# Patient Record
Sex: FEMALE | Race: WHITE | Employment: OTHER | URBAN - METROPOLITAN AREA
[De-identification: names, ages, dates, MRNs, and addresses within clinical notes are randomized per-mention and may not be internally consistent; named-entity substitution may affect disease eponyms.]

---

## 2017-01-06 ENCOUNTER — CLINICAL SUPPORT (OUTPATIENT)
Dept: CARDIOLOGY CLINIC | Age: 82
End: 2017-01-06

## 2017-01-06 DIAGNOSIS — Z95.0 CARDIAC PACEMAKER IN SITU: Primary | ICD-10-CM

## 2017-04-10 ENCOUNTER — CLINICAL SUPPORT (OUTPATIENT)
Dept: CARDIOLOGY CLINIC | Age: 82
End: 2017-04-10

## 2017-04-10 DIAGNOSIS — Z95.0 CARDIAC PACEMAKER IN SITU: Primary | ICD-10-CM

## 2017-07-13 ENCOUNTER — CLINICAL SUPPORT (OUTPATIENT)
Dept: CARDIOLOGY CLINIC | Age: 82
End: 2017-07-13

## 2017-07-13 DIAGNOSIS — Z95.0 CARDIAC PACEMAKER IN SITU: Primary | ICD-10-CM

## 2017-10-19 ENCOUNTER — CLINICAL SUPPORT (OUTPATIENT)
Dept: CARDIOLOGY CLINIC | Age: 82
End: 2017-10-19

## 2017-10-19 DIAGNOSIS — Z95.0 CARDIAC PACEMAKER IN SITU: Primary | ICD-10-CM

## 2018-01-24 ENCOUNTER — CLINICAL SUPPORT (OUTPATIENT)
Dept: CARDIOLOGY CLINIC | Age: 83
End: 2018-01-24

## 2018-01-24 DIAGNOSIS — Z95.0 CARDIAC PACEMAKER IN SITU: Primary | ICD-10-CM

## 2018-04-25 ENCOUNTER — HOSPITAL ENCOUNTER (OUTPATIENT)
Dept: LAB | Age: 83
Discharge: HOME | End: 2018-04-25
Payer: MEDICARE

## 2018-04-25 ENCOUNTER — OFFICE VISIT (OUTPATIENT)
Dept: CARDIOLOGY CLINIC | Age: 83
End: 2018-04-25

## 2018-04-25 VITALS
RESPIRATION RATE: 16 BRPM | SYSTOLIC BLOOD PRESSURE: 116 MMHG | HEART RATE: 64 BPM | WEIGHT: 146 LBS | OXYGEN SATURATION: 96 % | DIASTOLIC BLOOD PRESSURE: 52 MMHG | HEIGHT: 63 IN | BODY MASS INDEX: 25.87 KG/M2

## 2018-04-25 DIAGNOSIS — Z95.0 CARDIAC PACEMAKER IN SITU: Primary | ICD-10-CM

## 2018-04-25 DIAGNOSIS — I48.91 ATRIAL FIBRILLATION, UNSPECIFIED TYPE (HCC): Primary | ICD-10-CM

## 2018-04-25 DIAGNOSIS — Z95.0 PACEMAKER: ICD-10-CM

## 2018-04-25 PROCEDURE — 80048 BASIC METABOLIC PNL TOTAL CA: CPT

## 2018-04-25 PROCEDURE — 85027 COMPLETE CBC AUTOMATED: CPT

## 2018-04-25 PROCEDURE — 85610 PROTHROMBIN TIME: CPT

## 2018-04-25 PROCEDURE — 36415 COLL VENOUS BLD VENIPUNCTURE: CPT

## 2018-04-25 RX ORDER — ATORVASTATIN CALCIUM 40 MG/1
40 TABLET, FILM COATED ORAL DAILY
COMMUNITY

## 2018-04-25 RX ORDER — LISINOPRIL 2.5 MG/1
2.5 TABLET ORAL DAILY
COMMUNITY

## 2018-04-25 RX ORDER — LABETALOL 200 MG/1
200 TABLET, FILM COATED ORAL 2 TIMES DAILY
COMMUNITY

## 2018-04-25 RX ORDER — CHOLECALCIFEROL (VITAMIN D3) 125 MCG
2000 CAPSULE ORAL DAILY
COMMUNITY

## 2018-04-25 RX ORDER — HYDROCHLOROTHIAZIDE 50 MG/1
50 TABLET ORAL DAILY
COMMUNITY
End: 2019-06-27

## 2018-04-25 RX ORDER — LANOLIN ALCOHOL/MO/W.PET/CERES
1000 CREAM (GRAM) TOPICAL DAILY
COMMUNITY

## 2018-04-25 NOTE — PATIENT INSTRUCTIONS
Treatment Plan: You are scheduled for a pacemaker generator change at [facility] on Thursday, May 3rd. Please arrive at the patient registration desk located on the 2nd floor of the main building at 9:00am.    Do not eat or drink anything after midnight the night before your procedure. You will need a . You may take your normal medications with a sip of water the morning of your procedure. Except for the following: Pradaxa  Blood thinner instructions: Hold Pradaxa 1 day prior to procedure. Lab instructions: Please have labs drawn 3-5 days prior to scheduled procedure. Please call Ayala Cherry or Toyin Logan if you have any questions at [phone]. Please call the office if you develop any type of illness prior to your procedure. Pacemaker Placement: Before Your Procedure  What is pacemaker placement? A pacemaker is a small, battery-powered device. It sends electrical signals to the heart. This keeps the heartbeat steady when you have bradycardia (a slow heart rate). Thin wires, called leads, carry the signals between the pacemaker and the heart. This device is also called a pacer. You will get medicine before the procedure. This helps you relax and helps prevent pain. The doctor will make a cut in the skin just below your collarbone. The cut may be on either side of your chest. The doctor will put the pacemaker leads through the cut. The leads go into a large blood vessel in the upper chest. Then the doctor will guide the leads through the blood vessel into the heart. The doctor will place the pacemaker under the skin of your chest. He or she will attach the leads to the pacemaker. Then the cut will be closed with stitches. The procedure usually takes about an hour. You may need to spend the night in the hospital.  Pacemaker batteries usually last 5 to 15 years.  Your doctor will talk to you about how often you will need to have your pacemaker and battery checked. You can likely return to many of your normal activities after your procedure. But to stay safe, you may need to make some changes in your normal routine. You may feel worried about having a pacemaker. This is common. You might feel better if you learn techniques to help you relax. And it can help if you learn about how the pacemaker helps your heart. Talk to your doctor about your questions and concerns. Follow-up care is a key part of your treatment and safety. Be sure to make and go to all appointments, and call your doctor if you are having problems. It's also a good idea to know your test results and keep a list of the medicines you take. What happens before the procedure? ?Preparing for the procedure  ? · Understand exactly what procedure is planned, along with the risks, benefits, and other options. · Tell your doctors ALL the medicines, vitamins, supplements, and herbal remedies you take. Some of these can increase the risk of bleeding or interact with anesthesia. ? · If you take aspirin or some other blood thinner, be sure to talk to your doctor. He or she will tell you if you should stop taking these medicines before the procedure. Make sure that you understand exactly what your doctor wants you to do.   ? · Your doctor will tell you which medicines to take or stop before your procedure. You may need to stop taking certain medicines a week or more before the procedure. So talk to your doctor as soon as you can.   ? · If you have an advance directive, let your doctor know. It may include a living will and a durable power of  for health care. Bring a copy to the hospital. If you don't have one, you may want to prepare one. It lets your doctor and loved ones know your health care wishes. Doctors advise that everyone prepare these papers before any type of surgery or procedure. Procedures can be stressful. This information will help you understand what you can expect.  And it will help you safely prepare for your procedure. What happens on the day of the procedure? · Follow the instructions exactly about when to stop eating and drinking. If you don't, your procedure may be canceled. If your doctor told you to take your medicines on the day of the procedure, take them with only a sip of water. ? · Take a bath or shower before you come in for your procedure. Do not apply lotions, perfumes, deodorants, or nail polish. ? · Take off all jewelry and piercings. And take out contact lenses, if you wear them. ? At the hospital or surgery center   · Bring a picture ID. ? · You will be kept comfortable and safe by your anesthesia provider. You may get medicine that relaxes you or puts you in a light sleep. The area being worked on will be numb. ? · The procedure will take about an hour. Going home   · Be sure you have someone to drive you home. Anesthesia and pain medicine make it unsafe for you to drive. ? · You will be given more specific instructions about recovering from your procedure. They will cover things like diet, wound care, follow-up care, driving, and getting back to your normal routine. When should you call your doctor? · You have questions or concerns. ? · You don't understand how to prepare for your procedure. ? · You become ill before the procedure (such as fever, flu, or a cold). ? · You need to reschedule or have changed your mind about having the procedure. Where can you learn more? Go to http://veronica-kobi.info/. Enter X791 in the search box to learn more about \"Pacemaker Placement: Before Your Procedure. \"  Current as of: September 21, 2016  Content Version: 11.4  © 9964-8423 [employer]. Care instructions adapted under license by [employer] (which disclaims liability or warranty for this information).  If you have questions about a medical condition or this instruction, always ask your healthcare professional. [address] any warranty or liability for your use of this information.

## 2018-04-25 NOTE — PROGRESS NOTES
Visit Vitals    /52 (BP 1 Location: Left arm, BP Patient Position: Sitting)    Pulse 64    Resp 16    Ht 5' 3\" (1.6 m)    Wt 146 lb (66.2 kg)    SpO2 96%    BMI 25.86 kg/m2     Medication changes made  per verbal order of Dr. Lindajo Olszewski

## 2018-04-25 NOTE — PROGRESS NOTES
HISTORY OF PRESENTING ILLNESS      Chidi Charles is a 81 y.o. female with history of pacemaker, diabetes mellitus, dyslipidemia, atrial fibrillation, hypertension, complete heart block who is presenting to establish in our clinic. Her pacemaker was found to be at [facility]. She recently underwent stress test and echocardiogram with Dr. Elgin Hodge. Her left ventricular function was found to be preserved. ACTIVE PROBLEM LIST     Patient Active Problem List    Diagnosis Date Noted    Complete heart block ([address] 04/15/2011    AF (atrial fibrillation) ([address] 04/15/2011    HTN (hypertension), benign 04/15/2011    Orthostatic hypotension 04/15/2011           PAST MEDICAL HISTORY     Past Medical History:   Diagnosis Date    Atrial fibrillation ([address]     Cataract     Diabetes ([address]     Hypercholesterolemia     Hypertension     Pacemaker     Metronic           PAST SURGICAL HISTORY     Past Surgical History:   Procedure Laterality Date    COLONOSCOPY  4/14/2016         HX AFIB ABLATION      HX COLONOSCOPY      HX PACEMAKER  2007    Medtronic 2007          ALLERGIES     No Known Allergies       FAMILY HISTORY     No family history on file. negative for cardiac disease       SOCIAL HISTORY     Social History     Social History    Marital status: SINGLE     Spouse name: N/A    Number of children: N/A    Years of education: N/A     Social History Main Topics    Smoking status: Never Smoker    Smokeless tobacco: Not on file    Alcohol use No    Drug use: No    Sexual activity: Not on file     Other Topics Concern    Not on file     Social History Narrative         MEDICATIONS     Current Outpatient Prescriptions   Medication Sig    hydrochlorothiazide (HYDRODIURIL) 25 mg tablet Take 25 mg by mouth daily.  amLODIPine (NORVASC) 2.5 mg tablet Take 2.5 mg by mouth daily.  omeprazole (PRILOSEC) 20 mg capsule Take 20 mg by mouth daily.     lisinopril (PRINIVIL, ZESTRIL) 2.5 mg tablet Take 2.5 mg by mouth daily.  HYDROcodone-acetaminophen (NORCO) 5-325 mg per tablet Take 1 Tab by mouth every six (6) hours as needed for Pain. Max Daily Amount: 4 Tabs.  diazepam (VALIUM) 2 mg tablet Take 1 Tab by mouth every eight (8) hours as needed for Anxiety. Max Daily Amount: 6 mg.  metFORMIN (GLUCOPHAGE) 500 mg tablet Take 500 mg by mouth two (2) times daily (with meals).  DOCOSAHEXANOIC ACID/EPA (FISH OIL PO) Take 3,000 mg by mouth daily.  DABIGATRAN ETEXILATE MESYLATE (PRADAXA PO) Take 150 mg by mouth two (2) times a day.  ERGOCALCIFEROL, VITAMIN D2, (VITAMIN D PO) Take 2,000 Units by mouth daily.  VITAMIN B COMPLEX NO.12-NIACIN PO Take 1,000 mcg by mouth.  labetalol (NORMODYNE) 300 mg tablet Take 200 mg by mouth two (2) times a day.  atorvastatin (LIPITOR) 20 mg tablet Take  by mouth daily.  acarbose (PRECOSE) 50 mg tablet Take 50 mg by mouth three (3) times daily (with meals).  fenofibrate nanocrystallized (TRICOR) 48 mg tablet Take  by mouth daily. No current facility-administered medications for this visit. I have reviewed the nurses notes, vitals, problem list, allergy list, medical history, family, social history and medications. REVIEW OF SYMPTOMS      General: Pt denies excessive weight gain or loss. Pt is able to conduct ADL's  HEENT: Denies blurred vision, headaches, hearing loss, epistaxis and difficulty swallowing. Respiratory: Denies cough, congestion, shortness of breath, SANTOS, wheezing or stridor.   Cardiovascular: Denies precordial pain, palpitations, edema or PND  Gastrointestinal: Denies poor appetite, indigestion, abdominal pain or blood in stool  Genitourinary: Denies hematuria, dysuria, increased urinary frequency  Musculoskeletal: Denies joint pain or swelling from muscles or joints  Neurologic: Denies tremor, paresthesias, headache, or sensory motor disturbance  Psychiatric: Denies confusion, insomnia, depression  Integumentray: Denies rash, itching or ulcers. Hematologic: Denies easy bruising, bleeding       PHYSICAL EXAMINATION      There were no vitals filed for this visit. General: Well developed, in no acute distress. HEENT: No jaundice, oral mucosa moist, no oral ulcers  Neck: Supple, no stiffness, no lymphadenopathy, supple  Heart:  Normal S1/S2 negative S3 or S4. Regular, no murmur, gallop or rub, no jugular venous distention  Respiratory: Clear bilaterally x 4, no wheezing or rales  Abdomen:   Soft, non-tender, bowel sounds are active.   Extremities:  No edema, normal cap refill, no cyanosis. Musculoskeletal: No clubbing, no deformities  Neuro: A&Ox3, speech clear, gait stable, cooperative, no focal neurologic deficits  Skin: Skin color is normal. No rashes or lesions. Non diaphoretic, moist.  Vascular: 2+ pulses symmetric in all extremities       DIAGNOSTIC DATA      EKG:        LABORATORY DATA      Lab Results   Component Value Date/Time    WBC 8.6 07/27/2009 09:00 AM    HGB 13.2 07/27/2009 09:00 AM    HCT 37.9 07/27/2009 09:00 AM    PLATELET 977 79/70/8119 09:00 AM    MCV 84.6 07/27/2009 09:00 AM      Lab Results   Component Value Date/Time    Sodium 138 07/27/2009 09:00 AM    Potassium 4.3 07/27/2009 09:00 AM    Chloride 103 07/27/2009 09:00 AM    CO2 27 07/27/2009 09:00 AM    Anion gap 8 07/27/2009 09:00 AM    Glucose 163 (H) 07/27/2009 09:00 AM    BUN 23 (H) 07/27/2009 09:00 AM    Creatinine 0.9 07/27/2009 09:00 AM    BUN/Creatinine ratio 26 (H) 07/27/2009 09:00 AM    GFR est AA >60 07/27/2009 09:00 AM    GFR est non-AA >60 07/27/2009 09:00 AM    Calcium 8.9 07/27/2009 09:00 AM    Bilirubin, total 0.4 07/27/2009 09:00 AM    AST (SGOT) 35 07/27/2009 09:00 AM    Alk. phosphatase 88 07/27/2009 09:00 AM    Protein, total 6.9 07/27/2009 09:00 AM    Albumin 3.9 07/27/2009 09:00 AM    Globulin 3.0 07/27/2009 09:00 AM    A-G Ratio 1.3 07/27/2009 09:00 AM    ALT (SGPT) 40 07/27/2009 09:00 AM           ASSESSMENT      1. Pacemaker   A. Left-sided   B. Medtronic   C. Pacemaker dependent  2. Atrial fibrillation  3. Hypertension  4. Dyslipidemia  5. Complete heart block         PLAN     Plan for pacemaker generator change with Medtr[employer]. Will hold Pradaxa one day prior. FOLLOW-UP     Post procedure      Thank you, Shanice Germain MD and Dr. Sydni Forbes for allowing me to participate in the care of this extraordinarily pleasant female. Please do not hesitate to contact me for further questions/concerns.          Santi Quijano MD  Cardiac Electrophysiology / Cardiology    [facility].  [address] [place] [address], [facility], [address]  [phone] / [phone] Fax   [phone] / [phone] Fax

## 2018-04-26 PROBLEM — Z45.018 ELECTIVE REPLACEMENT INDICATED FOR PACEMAKER: Status: ACTIVE | Noted: 2018-04-26

## 2018-04-26 LAB
BUN SERPL-MCNC: 15 MG/DL (ref 8–27)
BUN/CREAT SERPL: 19 (ref 12–28)
CALCIUM SERPL-MCNC: 9 MG/DL (ref 8.7–10.3)
CHLORIDE SERPL-SCNC: 102 MMOL/L (ref 96–106)
CO2 SERPL-SCNC: 23 MMOL/L (ref 18–29)
CREAT SERPL-MCNC: 0.77 MG/DL (ref 0.57–1)
ERYTHROCYTE [DISTWIDTH] IN BLOOD BY AUTOMATED COUNT: 15 % (ref 12.3–15.4)
GFR SERPLBLD CREATININE-BSD FMLA CKD-EPI: 72 ML/MIN/1.73
GFR SERPLBLD CREATININE-BSD FMLA CKD-EPI: 83 ML/MIN/1.73
GLUCOSE SERPL-MCNC: 123 MG/DL (ref 65–99)
HCT VFR BLD AUTO: 39.1 % (ref 34–46.6)
HGB BLD-MCNC: 13 G/DL (ref 11.1–15.9)
INR PPP: 5.3 (ref 0.8–1.2)
MCH RBC QN AUTO: 30 PG (ref 26.6–33)
MCHC RBC AUTO-ENTMCNC: 33.2 G/DL (ref 31.5–35.7)
MCV RBC AUTO: 90 FL (ref 79–97)
PLATELET # BLD AUTO: 172 X10E3/UL (ref 150–379)
POTASSIUM SERPL-SCNC: 4.3 MMOL/L (ref 3.5–5.2)
PROTHROMBIN TIME: 50 SEC (ref 9.1–12)
RBC # BLD AUTO: 4.33 X10E6/UL (ref 3.77–5.28)
SODIUM SERPL-SCNC: 143 MMOL/L (ref 134–144)
WBC # BLD AUTO: 7.4 X10E3/UL (ref 3.4–10.8)

## 2018-04-26 RX ORDER — SODIUM CHLORIDE 0.9 % (FLUSH) 0.9 %
5-10 SYRINGE (ML) INJECTION EVERY 8 HOURS
Status: CANCELLED | OUTPATIENT
Start: 2018-04-26

## 2018-04-26 RX ORDER — SODIUM CHLORIDE 0.9 % (FLUSH) 0.9 %
5-10 SYRINGE (ML) INJECTION AS NEEDED
Status: CANCELLED | OUTPATIENT
Start: 2018-04-26

## 2018-05-02 ENCOUNTER — TELEPHONE (OUTPATIENT)
Dept: CARDIOLOGY CLINIC | Age: 83
End: 2018-05-02

## 2018-05-03 ENCOUNTER — HOSPITAL ENCOUNTER (OUTPATIENT)
Dept: NON INVASIVE DIAGNOSTICS | Age: 83
Discharge: HOME | End: 2018-05-03
Attending: INTERNAL MEDICINE | Admitting: INTERNAL MEDICINE
Payer: MEDICARE

## 2018-05-03 ENCOUNTER — TELEPHONE (OUTPATIENT)
Dept: CARDIOLOGY CLINIC | Age: 83
End: 2018-05-03

## 2018-05-03 VITALS
BODY MASS INDEX: 25.16 KG/M2 | RESPIRATION RATE: 13 BRPM | HEIGHT: 63 IN | SYSTOLIC BLOOD PRESSURE: 125 MMHG | OXYGEN SATURATION: 98 % | HEART RATE: 70 BPM | WEIGHT: 141.98 LBS | DIASTOLIC BLOOD PRESSURE: 51 MMHG | TEMPERATURE: 97.6 F

## 2018-05-03 LAB
GLUCOSE BLD STRIP.AUTO-MCNC: 119 MG/DL (ref 65–100)
GLUCOSE BLD STRIP.AUTO-MCNC: 123 MG/DL (ref 65–100)
INR BLD: 1.3 (ref 0.9–1.2)
SERVICE CMNT-IMP: ABNORMAL
SERVICE CMNT-IMP: ABNORMAL

## 2018-05-03 PROCEDURE — 77030018729 HC ELECTRD DEFIB PAD CARD -B

## 2018-05-03 PROCEDURE — 77030028698 HC BLD TISS PLSM MEDT -D

## 2018-05-03 PROCEDURE — C1785 PMKR, DUAL, RATE-RESP: HCPCS | Performed by: INTERNAL MEDICINE

## 2018-05-03 PROCEDURE — 77030002933 HC SUT MCRYL J&J -A

## 2018-05-03 PROCEDURE — 85610 PROTHROMBIN TIME: CPT

## 2018-05-03 PROCEDURE — 74011000250 HC RX REV CODE- 250: Performed by: INTERNAL MEDICINE

## 2018-05-03 PROCEDURE — 99152 MOD SED SAME PHYS/QHP 5/>YRS: CPT | Performed by: INTERNAL MEDICINE

## 2018-05-03 PROCEDURE — 33228 REMV&REPLC PM GEN DUAL LEAD: CPT

## 2018-05-03 PROCEDURE — 77030012935 HC DRSG AQUACEL BMS -B

## 2018-05-03 PROCEDURE — 99153 MOD SED SAME PHYS/QHP EA: CPT | Performed by: INTERNAL MEDICINE

## 2018-05-03 PROCEDURE — 82962 GLUCOSE BLOOD TEST: CPT

## 2018-05-03 PROCEDURE — 77030031139 HC SUT VCRL2 J&J -A

## 2018-05-03 PROCEDURE — 74011250636 HC RX REV CODE- 250/636: Performed by: INTERNAL MEDICINE

## 2018-05-03 PROCEDURE — 77030011640 HC PAD GRND REM COVD -A

## 2018-05-03 PROCEDURE — 77030018673

## 2018-05-03 RX ORDER — SODIUM CHLORIDE 0.9 % (FLUSH) 0.9 %
5-10 SYRINGE (ML) INJECTION EVERY 8 HOURS
Status: CANCELLED | OUTPATIENT
Start: 2018-05-03

## 2018-05-03 RX ORDER — FENTANYL CITRATE 50 UG/ML
25-200 INJECTION, SOLUTION INTRAMUSCULAR; INTRAVENOUS
Status: DISCONTINUED | OUTPATIENT
Start: 2018-05-03 | End: 2018-05-03 | Stop reason: HOSPADM

## 2018-05-03 RX ORDER — ACETAMINOPHEN 325 MG/1
650 TABLET ORAL
Status: CANCELLED | OUTPATIENT
Start: 2018-05-03

## 2018-05-03 RX ORDER — BUPIVACAINE HYDROCHLORIDE 5 MG/ML
20 INJECTION, SOLUTION EPIDURAL; INTRACAUDAL ONCE
Status: COMPLETED | OUTPATIENT
Start: 2018-05-03 | End: 2018-05-03

## 2018-05-03 RX ORDER — DIPHENHYDRAMINE HYDROCHLORIDE 50 MG/ML
25-50 INJECTION, SOLUTION INTRAMUSCULAR; INTRAVENOUS ONCE
Status: COMPLETED | OUTPATIENT
Start: 2018-05-03 | End: 2018-05-03

## 2018-05-03 RX ORDER — CEFAZOLIN SODIUM/WATER 2 G/20 ML
2 SYRINGE (ML) INTRAVENOUS ONCE
Status: COMPLETED | OUTPATIENT
Start: 2018-05-03 | End: 2018-05-03

## 2018-05-03 RX ORDER — SODIUM CHLORIDE 0.9 % (FLUSH) 0.9 %
5-10 SYRINGE (ML) INJECTION AS NEEDED
Status: CANCELLED | OUTPATIENT
Start: 2018-05-03

## 2018-05-03 RX ORDER — HYDROCODONE BITARTRATE AND ACETAMINOPHEN 5; 325 MG/1; MG/1
1 TABLET ORAL
Status: CANCELLED | OUTPATIENT
Start: 2018-05-03

## 2018-05-03 RX ORDER — ONDANSETRON 2 MG/ML
4 INJECTION INTRAMUSCULAR; INTRAVENOUS
Status: CANCELLED | OUTPATIENT
Start: 2018-05-03

## 2018-05-03 RX ORDER — CEPHALEXIN 500 MG/1
500 CAPSULE ORAL 3 TIMES DAILY
Qty: 15 CAP | Refills: 0 | Status: SHIPPED | OUTPATIENT
Start: 2018-05-03 | End: 2018-05-08

## 2018-05-03 RX ORDER — LIDOCAINE HYDROCHLORIDE AND EPINEPHRINE 10; 10 MG/ML; UG/ML
20 INJECTION, SOLUTION INFILTRATION; PERINEURAL ONCE
Status: COMPLETED | OUTPATIENT
Start: 2018-05-03 | End: 2018-05-03

## 2018-05-03 RX ORDER — SODIUM CHLORIDE 0.9 % (FLUSH) 0.9 %
5-10 SYRINGE (ML) INJECTION EVERY 8 HOURS
Status: DISCONTINUED | OUTPATIENT
Start: 2018-05-03 | End: 2018-05-03 | Stop reason: HOSPADM

## 2018-05-03 RX ORDER — HEPARIN SODIUM 200 [USP'U]/100ML
500 INJECTION, SOLUTION INTRAVENOUS ONCE
Status: COMPLETED | OUTPATIENT
Start: 2018-05-03 | End: 2018-05-03

## 2018-05-03 RX ORDER — GENTAMICIN SULFATE 80 MG/100ML
80 INJECTION, SOLUTION INTRAVENOUS ONCE
Status: COMPLETED | OUTPATIENT
Start: 2018-05-03 | End: 2018-05-03

## 2018-05-03 RX ORDER — SODIUM CHLORIDE 0.9 % (FLUSH) 0.9 %
5-10 SYRINGE (ML) INJECTION AS NEEDED
Status: DISCONTINUED | OUTPATIENT
Start: 2018-05-03 | End: 2018-05-03 | Stop reason: HOSPADM

## 2018-05-03 RX ORDER — BACITRACIN 50000 [IU]/1
INJECTION, POWDER, FOR SOLUTION INTRAMUSCULAR
Status: DISCONTINUED
Start: 2018-05-03 | End: 2018-05-03 | Stop reason: HOSPADM

## 2018-05-03 RX ORDER — MIDAZOLAM HYDROCHLORIDE 1 MG/ML
.5-1 INJECTION, SOLUTION INTRAMUSCULAR; INTRAVENOUS
Status: DISCONTINUED | OUTPATIENT
Start: 2018-05-03 | End: 2018-05-03 | Stop reason: HOSPADM

## 2018-05-03 RX ADMIN — LIDOCAINE HYDROCHLORIDE,EPINEPHRINE BITARTRATE 200 MG: 10; .01 INJECTION, SOLUTION INFILTRATION; PERINEURAL at 12:13

## 2018-05-03 RX ADMIN — HEPARIN SODIUM IN SODIUM CHLORIDE 1000 UNITS: 200 INJECTION INTRAVENOUS at 12:08

## 2018-05-03 RX ADMIN — BUPIVACAINE HYDROCHLORIDE 100 MG: 5 INJECTION, SOLUTION EPIDURAL; INTRACAUDAL at 12:13

## 2018-05-03 RX ADMIN — SODIUM CHLORIDE: 900 INJECTION, SOLUTION INTRAVENOUS at 12:17

## 2018-05-03 RX ADMIN — Medication 2 G: at 12:05

## 2018-05-03 RX ADMIN — GENTAMICIN SULFATE 80 MG: 80 INJECTION, SOLUTION INTRAVENOUS at 12:10

## 2018-05-03 RX ADMIN — MIDAZOLAM HYDROCHLORIDE 1 MG: 1 INJECTION, SOLUTION INTRAMUSCULAR; INTRAVENOUS at 12:14

## 2018-05-03 RX ADMIN — MIDAZOLAM HYDROCHLORIDE 2 MG: 1 INJECTION, SOLUTION INTRAMUSCULAR; INTRAVENOUS at 12:09

## 2018-05-03 RX ADMIN — DIPHENHYDRAMINE HYDROCHLORIDE 25 MG: 50 INJECTION, SOLUTION INTRAMUSCULAR; INTRAVENOUS at 12:05

## 2018-05-03 RX ADMIN — FENTANYL CITRATE 25 MCG: 50 INJECTION, SOLUTION INTRAMUSCULAR; INTRAVENOUS at 12:09

## 2018-05-03 NOTE — H&P
HISTORY OF PRESENTING ILLNESS       Reji Lagunas is a 81 y.o. female with history of pacemaker, diabetes mellitus, dyslipidemia, atrial fibrillation, hypertension, complete heart block who is presenting to establish in our clinic. Her pacemaker was found to be at [facility]. She recently underwent stress test and echocardiogram with Dr. Judit Levy. Her left ventricular function was found to be preserved.         ACTIVE PROBLEM LIST           Patient Active Problem List     Diagnosis Date Noted    Complete heart block ([address] 04/15/2011    AF (atrial fibrillation) ([address] 04/15/2011    HTN (hypertension), benign 04/15/2011    Orthostatic hypotension 04/15/2011             PAST MEDICAL HISTORY           Past Medical History:   Diagnosis Date    Atrial fibrillation ([address]      Cataract      Diabetes ([address]      Hypercholesterolemia      Hypertension      Pacemaker       Metronic             PAST SURGICAL HISTORY            Past Surgical History:   Procedure Laterality Date    COLONOSCOPY   4/14/2016           HX AFIB ABLATION        HX COLONOSCOPY        HX PACEMAKER   2007     Medtronic 2007             ALLERGIES      No Known Allergies         FAMILY HISTORY      No family history on file. negative for cardiac disease         SOCIAL HISTORY       Social History                Social History    Marital status: SINGLE       Spouse name: N/A    Number of children: N/A    Years of education: N/A           Social History Main Topics    Smoking status: Never Smoker    Smokeless tobacco: Not on file    Alcohol use No    Drug use: No    Sexual activity: Not on file           Other Topics Concern    Not on file      Social History Narrative               MEDICATIONS           Current Outpatient Prescriptions   Medication Sig    hydrochlorothiazide (HYDRODIURIL) 25 mg tablet Take 25 mg by mouth daily.  amLODIPine (NORVASC) 2.5 mg tablet Take 2.5 mg by mouth daily.     omeprazole (PRILOSEC) 20 mg capsule Take 20 mg by mouth daily.  lisinopril (PRINIVIL, ZESTRIL) 2.5 mg tablet Take 2.5 mg by mouth daily.  HYDROcodone-acetaminophen (NORCO) 5-325 mg per tablet Take 1 Tab by mouth every six (6) hours as needed for Pain. Max Daily Amount: 4 Tabs.  diazepam (VALIUM) 2 mg tablet Take 1 Tab by mouth every eight (8) hours as needed for Anxiety. Max Daily Amount: 6 mg.  metFORMIN (GLUCOPHAGE) 500 mg tablet Take 500 mg by mouth two (2) times daily (with meals).  DOCOSAHEXANOIC ACID/EPA (FISH OIL PO) Take 3,000 mg by mouth daily.  DABIGATRAN ETEXILATE MESYLATE (PRADAXA PO) Take 150 mg by mouth two (2) times a day.  ERGOCALCIFEROL, VITAMIN D2, (VITAMIN D PO) Take 2,000 Units by mouth daily.  VITAMIN B COMPLEX NO.12-NIACIN PO Take 1,000 mcg by mouth.  labetalol (NORMODYNE) 300 mg tablet Take 200 mg by mouth two (2) times a day.  atorvastatin (LIPITOR) 20 mg tablet Take  by mouth daily.  acarbose (PRECOSE) 50 mg tablet Take 50 mg by mouth three (3) times daily (with meals).  fenofibrate nanocrystallized (TRICOR) 48 mg tablet Take  by mouth daily.      No current facility-administered medications for this visit.          I have reviewed the nurses notes, vitals, problem list, allergy list, medical history, family, social history and medications.         REVIEW OF SYMPTOMS       General: Pt denies excessive weight gain or loss. Pt is able to conduct ADL's  HEENT: Denies blurred vision, headaches, hearing loss, epistaxis and difficulty swallowing. Respiratory: Denies cough, congestion, shortness of breath, SANTOS, wheezing or stridor.   Cardiovascular: Denies precordial pain, palpitations, edema or PND  Gastrointestinal: Denies poor appetite, indigestion, abdominal pain or blood in stool  Genitourinary: Denies hematuria, dysuria, increased urinary frequency  Musculoskeletal: Denies joint pain or swelling from muscles or joints  Neurologic: Denies tremor, paresthesias, headache, or sensory motor disturbance  Psychiatric: Denies confusion, insomnia, depression  Integumentray: Denies rash, itching or ulcers. Hematologic: Denies easy bruising, bleeding         PHYSICAL EXAMINATION       There were no vitals filed for this visit. General: Well developed, in no acute distress. HEENT: No jaundice, oral mucosa moist, no oral ulcers  Neck: Supple, no stiffness, no lymphadenopathy, supple  Heart:  Normal S1/S2 negative S3 or S4. Regular, no murmur, gallop or rub, no jugular venous distention  Respiratory: Clear bilaterally x 4, no wheezing or rales  Abdomen:   Soft, non-tender, bowel sounds are active.   Extremities:  No edema, normal cap refill, no cyanosis. Musculoskeletal: No clubbing, no deformities  Neuro: A&Ox3, speech clear, gait stable, cooperative, no focal neurologic deficits  Skin: Skin color is normal. No rashes or lesions. Non diaphoretic, moist.  Vascular: 2+ pulses symmetric in all extremities         DIAGNOSTIC DATA       EKG:          LABORATORY DATA             Lab Results   Component Value Date/Time     WBC 8.6 07/27/2009 09:00 AM     HGB 13.2 07/27/2009 09:00 AM     HCT 37.9 07/27/2009 09:00 AM     PLATELET 548 72/69/3029 09:00 AM     MCV 84.6 07/27/2009 09:00 AM            Lab Results   Component Value Date/Time     Sodium 138 07/27/2009 09:00 AM     Potassium 4.3 07/27/2009 09:00 AM     Chloride 103 07/27/2009 09:00 AM     CO2 27 07/27/2009 09:00 AM     Anion gap 8 07/27/2009 09:00 AM     Glucose 163 (H) 07/27/2009 09:00 AM     BUN 23 (H) 07/27/2009 09:00 AM     Creatinine 0.9 07/27/2009 09:00 AM     BUN/Creatinine ratio 26 (H) 07/27/2009 09:00 AM     GFR est AA >60 07/27/2009 09:00 AM     GFR est non-AA >60 07/27/2009 09:00 AM     Calcium 8.9 07/27/2009 09:00 AM     Bilirubin, total 0.4 07/27/2009 09:00 AM     AST (SGOT) 35 07/27/2009 09:00 AM     Alk.  phosphatase 88 07/27/2009 09:00 AM     Protein, total 6.9 07/27/2009 09:00 AM     Albumin 3.9 07/27/2009 09:00 AM     Globulin 3.0 07/27/2009 09:00 AM     A-G Ratio 1.3 07/27/2009 09:00 AM     ALT (SGPT) 40 07/27/2009 09:00 AM             ASSESSMENT       1. Pacemaker                        A.  Left-sided                        B. Medtronic                        C. Pacemaker dependent  2. Atrial fibrillation  3. Hypertension  4. Dyslipidemia  5. Complete heart block            PLAN      Plan for pacemaker generator change with Medtronic. Will hold Pradaxa one day prior.      FOLLOW-UP      Post procedure        Thank you, Mak Barclay MD and Dr. Xavi Villasenor for allowing me to participate in the care of this extraordinarily pleasant female.  Please do not hesitate to contact me for further questions/concerns.            Nirmal Morris MD  Cardiac Electrophysiology / Cardiology     [address], [address] [facility], [address]  [address]  [phone] / [phone] Fax                                                                  [phone] / [phone] Fax

## 2018-05-03 NOTE — IP AVS SNAPSHOT
[address] 
[phone] Patient: Carlin Salazar MRN: [id number] :1935 About your hospitalization You were admitted on:  May 3, 2018 You last received care in the:  [facility] PACU You were discharged on:  May 3, 2018 Why you were hospitalized Your primary diagnosis was:  Not on File Follow-up Information Follow up With Details Comments Contact Info Yonas Torres MD   [address] [place] [address] [address] [place] [address] 
[phone] Discharge Orders None A check wolf indicates which time of day the medication should be taken. My Medications START taking these medications Instructions Each Dose to Equal  
 Morning Noon Evening Bedtime  
 cephALEXin 500 mg capsule Commonly known as:  Blaine Kleine Your last dose was: Your next dose is: Take 1 Cap by mouth three (3) times daily for 5 days. 500 mg CONTINUE taking these medications Instructions Each Dose to Equal  
 Morning Noon Evening Bedtime  
 amLODIPine 2.5 mg tablet Commonly known as:  Lucy Waite Your last dose was: Your next dose is: Take 2.5 mg by mouth two (2) times a day. 2.5 mg  
    
   
   
   
  
 cyanocobalamin 1,000 mcg tablet Your last dose was: Your next dose is: Take 1,000 mcg by mouth daily. 1000 mcg FERROUS SULFATE PO Your last dose was: Your next dose is: Take 28 mg by mouth daily. 28 mg FISH OIL PO Your last dose was: Your next dose is: Take 3,000 mg by mouth daily. 3000 mg  
    
   
   
   
  
 hydroCHLOROthiazide 50 mg tablet Commonly known as:  HYDRODIURIL Your last dose was: Your next dose is: Take 50 mg by mouth daily.   
 50 mg  
    
   
 labetalol 200 mg tablet Commonly known as:  Dorie Raines Your last dose was: Your next dose is: Take  by mouth two (2) times a day. LIPITOR 40 mg tablet Generic drug:  atorvastatin Your last dose was: Your next dose is: Take  by mouth daily. lisinopril 5 mg tablet Commonly known as:  Jean Claude Ansari Your last dose was: Your next dose is: Take  by mouth daily. metFORMIN 500 mg tablet Commonly known as:  GLUCOPHAGE Your last dose was: Your next dose is: Take 500 mg by mouth three (3) times daily (with meals). 500 mg PRADAXA PO Your last dose was: Your next dose is: Take 150 mg by mouth two (2) times a day. 150 mg PRECOSE 50 mg tablet Generic drug:  acarbose Your last dose was: Your next dose is: Take 50 mg by mouth two (2) times daily (with meals). 50 mg PRESERVISION AREDS 2 PO Your last dose was: Your next dose is: Take  by mouth two (2) times a day. VITAMIN D3 2,000 unit Tab Generic drug:  cholecalciferol (vitamin D3) Your last dose was: Your next dose is: Take  by mouth daily. Where to Get Your Medications These medications were sent to [facility] [address] [place] [address] [place], [place] [address] [place] [address] ([facility]), [address] [place] [address] Street Phone:  [phone]  
  cephALEXin 500 mg capsule Discharge Instructions Pacemaker Generator Change Discharge Instructions Please make sure you have received your Temporary Pacemaker identification card with your discharge instructions MEDICATIONS ? Take only the medications prescribed to you at discharge. ? You are prescribed an antibiotic to take for 5 days. Please do not miss doses of this prescription. ACTIVITY ? Return to your normal activity, except as noted below. o Avoid tight clothes or unnecessary pressure over your incision (such as bra straps or seat belts). If it is tender or sensitive to clothing, cover the incision with a soft dressing or pad. 
o Questions about driving are individualized and should be discussed with one of the EP Physicians prior to discharge. SHOWERING  
  
 
? Leave the bandage over your incision for 7 days after the Pacemaker implant. You bandage will be removed in clinic in 7 days. ? It is important to keep the bandaged area clean and dry. You may shower with the aquacel dressing in place as long as it is intact on all four sides. Do not point the direction of water directly at the site. Do not apply any lotions, powders, or perfumes to the incision line. If you have an aquacel dressing in place you may shower starting in 24 hours as long as the dressing is intact on all four sides and you do not point the flow of water directly at the site. ? Avoid submerging your incision in water (tub baths, hot tubs, or swimming) for four weeks. ? Underneath the dressing. o If you have white steri-strips over your incision (underneath the gauze dressing), they will curl up at the end and fall off, usually within 10 days. Do not pull them off. 
- OR -  
o You may have a different type of closure for the incision. If Dermabond Adhesive was used to close your incision, you will receive a separate instruction sheet. DISCHARGE PRECAUTIONS ? Record your temperature every day, at the same time, for 3 weeks after your implant. A temperature of 100.5 F, or higher, can be the first sign of infection. This should be reported to your Doctor immediately. ? Always tell your doctor or dentist that you have a Pacemaker. Antibiotics may be prescribed before certain procedures. ? If you use a cell phone, hold it on the opposite side from where your Pacemaker is implanted. ? Your temporary identification will be given to you with these instructions. Keep your Pacemaker card in your wallet or on your person at all times. You should receive your permanent card in 8 weeks. If you do not receive your permanent card, please call the office at [phone]. TAKING YOUR PULSE ? Take your pulse the same time every day, preferably in the morning. ? Sit down and rest for 5 minutes prior to taking your pulse. ? Take your pulse for 1 full minute, use a clock or stop watch with a second hand. ? To feel your pulse, use the first two fingers of one hand; place them on the thumb side of the wrist of the opposite hand. The pulse will be steady, regular and throbbing. ? Call the EP Lab Doctors if your pulse is less than 40 beats per minute. SYMPTOMS THAT NEED TO BE REPORTED IMMEDIATELY ? Temperature more than 100.4 F ? Redness or warmth at the incision site, or pain for longer than the first 5 days after the implant. ? Drainage from the incision site. ? Swelling around the incision site. ? Shortness of breath. ? Rapid heart rate or palpitations. ? Dizziness, lightheadedness, fainting. ? Slow pulse below 40 beats per minute. ? REMEMBER: If you feel something is an emergency or cannot be handled over the phone, call 911 or go to the closest emergency room. Santi Quijano MD 
Cardiac Electrophysiology / Cardiology [address] 
826 [facility], [address] [facility], [address] Sonya P[address]el[address] [phone] / [phone] Fax       [phone] / [phone] Fax Pacemaker Discharge Instructions Please make sure you have received your Temporary Pacemaker identification card with your discharge instructions MEDICATIONS ? Take only the medications prescribed to you at discharge. ? You are prescribed an antibiotic to take for 5 days. Please do not miss doses of this prescription. ACTIVITY ? Return to your normal activity, except as noted below. o Avoid tight clothes or unnecessary pressure over your incision (such as bra straps or seat belts). If it is tender or sensitive to clothing, cover the incision with a soft dressing or pad. 
o Questions about driving are individualized and should be discussed with one of the EP Physicians prior to discharge. SHOWERING  
  
 
? Leave the bandage over your incision for 7 days after the Pacemaker implant. You bandage will be removed in clinic in 7 days. ? It is important to keep the bandaged area clean and dry. You may shower around the site until the bandage is removed in clinic. Thereafter, you may shower after the bandage is removed, washing it gently with soap and water. Do not apply any lotions, powders, or perfumes to the incision line. ? Avoid submerging your incision in water (tub baths, hot tubs, or swimming) for four weeks. ? Underneath the dressing. o If you have white steri-strips over your incision (underneath the gauze dressing), they will curl up at the end and fall off, usually within 10 days. Do not pull them off. 
- OR -  
o You may have a different type of closure for the incision. If Dermabond Adhesive was used to close your incision, you will receive a separate instruction sheet. DISCHARGE PRECAUTIONS ? Record your temperature every day, at the same time, for 3 weeks after your implant.   A temperature of 100.5 F, or higher, can be the first sign of infection. This should be reported to your Doctor immediately. ? You can have an MRI. You must be aware that any strong magnet or magnetic field can affect your Pacemaker. In general, be careful of metal detectors, heavy machinery, and any area where arc-welding is performed. Avoid metal detectors such as the ones in security checkpoints at [facility] or [address] [place] [address]. When approaching a security checkpoint show your Pacemaker ID Card to security personnel and ask to be hand searched. ? Always tell your doctor or dentist that you have a Pacemaker. Antibiotics may be prescribed before certain procedures. ? If you use a cell phone, hold it on the opposite side from where your Pacemaker is implanted. ? Your temporary identification will be given to you with these instructions. Keep your Pacemaker card in your wallet or on your person at all times. You should receive your permanent card in 8 weeks. If you do not receive your permanent card, please call the office at [phone]. TAKING YOUR PULSE ? Take your pulse the same time every day, preferably in the morning. ? Sit down and rest for 5 minutes prior to taking your pulse. ? Take your pulse for 1 full minute, use a clock or stop watch with a second hand. ? To feel your pulse, use the first two fingers of one hand; place them on the thumb side of the wrist of the opposite hand. The pulse will be steady, regular and throbbing. ? Call the EP Lab Doctors if your pulse is less than 40 beats per minute. SYMPTOMS THAT NEED TO BE REPORTED IMMEDIATELY ? Temperature more than 100.4 F ? Redness or warmth at the incision site, or pain for longer than the first 5 days after the implant. ? Drainage from the incision site. ? Swelling around the incision site. ? Shortness of breath. ? Rapid heart rate or palpitations. ? Dizziness, lightheadedness, fainting. ? Slow pulse below 40 beats per minute. ? REMEMBER: If you feel something is an emergency or cannot be handled over the phone, call 911 or go to the closest emergency room. FOLLOW UP APPOINTMENT WITH DR Al Fitzpatrick IN 1 WEEK Lindajo Olszewski, MD 
Cardiac Electrophysiology / Cardiology [address] 
566 [facility], 69 [facility], [address]         Yany Owens 
[phone] / [phone] Fax       [phone] / [phone] Fax Introducing [facility]! Reyna Palacios introduces [facility] patient portal. Now you can access parts of your medical record, email your doctor's office, and request medication refills online. 1. In your internet browser, go to [email or web address] 2. Click on the First Time User? Click Here link in the Sign In box. You will see the New Member Sign Up page. 3. Enter your [facility] Access Code exactly as it appears below. You will not need to use this code after youve completed the sign-up process. If you do not sign up before the expiration date, you must request a new code. · [facility] Access Code: [email or web address] Expires: 7/29/2018  3:49 PM 
 
4. Enter the last four digits of your Social Security Number (xxxx) and Date of Birth (mm/dd/yyyy) as indicated and click Submit. You will be taken to the next sign-up page. 5. Create a Scratch Hardt ID. This will be your [facility] login ID and cannot be changed, so think of one that is secure and easy to remember. 6. Create a Scratch Hardt password. You can change your password at any time. 7. Enter your Password Reset Question and Answer. This can be used at a later time if you forget your password. 8. Enter your e-mail address. You will receive e-mail notification when new information is available in [address] 9. Click Sign Up. You can now view and download portions of your medical record. 10. Click the Download Summary menu link to download a portable copy of your medical information. If you have questions, please visit the Frequently Asked Questions section of the [employer] website. Remember, [email or web address] is NOT to be used for urgent needs. For medical emergencies, dial 911. Now available from your iPhone and Android! Introducing Karri Bauer As a [facility] patient, I wanted to make you aware of our electronic visit tool called Karri Bauer. [employer]/MirDeneg allows you to connect within minutes with a medical provider 24 hours a day, seven days a week via a mobile device or tablet or logging into a secure website from your computer. You can access Karri Baeur from anywhere in the [place]. A virtual visit might be right for you when you have a simple condition and feel like you just dont want to get out of bed, or cant get away from work for an appointment, when your regular [facility] provider is not available (evenings, weekends or holidays), or when youre out of town and need minor care. Electronic visits cost only $49 and if the [employer]/MirDeneg provider determines a prescription is needed to treat your condition, one can be electronically transmitted to a nearby pharmacy*. Please take a moment to enroll today if you have not already done so. The enrollment process is free and takes just a few minutes. To enroll, please download the [employer] eduard to your tablet or phone, or visit [email or web address] to enroll on your computer. And, as an [address] [place] [address] patient with a [employer] account, the results of your visits will be scanned into your electronic medical record and your primary care provider will be able to view the scanned results. We urge you to continue to see your regular [facility] provider for your ongoing medical care.   And while your primary care provider may not be the one available when you seek a Karri Bauer virtual visit, the peace of mind you get from getting a real diagnosis real time can be priceless. For more information on Karri Bauer, view our Frequently Asked Questions (FAQs) at [email or web address]. Sincerely, 
 
Pattie Callahan MD 
Chief Medical Officer [address] *:  certain medications cannot be prescribed via Karri Bauer Providers Seen During Your Hospitalization Provider Specialty Primary office phone Max Manriuqez MD Cardiology [phone] Your Primary Care Physician (PCP) Primary Care Physician Office Phone Office Fax Edel Noonan [phone] You are allergic to the following No active allergies Recent Documentation Height Weight BMI OB Status Smoking Status 1.6 m 64.4 kg 25.15 kg/m2 Postmenopausal Never Smoker Emergency Contacts Name Discharge Info Relation Home Work Mobile Ginny Rodriguez CAREGIVER [3] Daughter [21] [phone] Patient Belongings The following personal items are in your possession at time of discharge: 
     Visual Aid: Glasses Please provide this summary of care documentation to your next provider. Signatures-by signing, you are acknowledging that this After Visit Summary has been reviewed with you and you have received a copy. Patient Signature:  ____________________________________________________________ Date:  ____________________________________________________________  
  
Arna Sheffield Provider Signature:  ____________________________________________________________ Date:  ____________________________________________________________

## 2018-05-03 NOTE — TELEPHONE ENCOUNTER
Patient called in to schedule her 1 week follow up appointment per Dr Mart Lara. Patient stated that she had surgery yesterday.   Phone [phone]  Joselin Ramirez

## 2018-05-03 NOTE — PROCEDURES
Cardiac Electrophysiology Report      PATIENT INFORMATION      Patient Name: Heath Sparrow  MRN: [id number]             Study Date: 5/3/2018    YOB: 1935   Age: 81 y.o. Gender: female      Procedure:  Pacemaker Generator Change    Referring Physician:  Cata Huynh MD and Dr. Jess Duarte     Duty Name   Electrophysiologist Christie Goldstein MD   Monitor Lynda Orellana RN   Circulator Candace Pulido RN; Varghese Herron RN       PATIENT HISTORY     Anuj Nieves a 81 y. o. female with history of pacemaker, diabetes mellitus, dyslipidemia, atrial fibrillation, hypertension, complete heart block who is presenting to establish in our clinic.  Her pacemaker was found to be at [facility].  She recently underwent stress test and echocardiogram with Dr. Lorrie Arguelles left ventricular function was found to be preserved. She presents for a pacemaker generator change. PROCEDURE     The patient was brought to the Cardiac Electrophysiology laboratory in a post-absorptive, fasting state. Informed consent was obtained. A peripheral IV was in place. Continuous electrocardiographic, blood pressure, O2 saturation and  CO2 monitoring was initiated. Pre-operative antibiotics were administered pre-operatively. Self-adhesive cardioversion patches were positioned on the chest.  Conscious sedation was effectuated according to protocol. The patient was then prepped and draped in the usual sterile fashion. A 50/50 mixture of lidocaine (1%) with epinephrine and bupivicaine (0.5%) was utilized for local anesthesia. An incision was performed over the chronic pulse generator. Sharp and blunt dissection was carried down to the level of the generator. Hemostasis was maintained with electrocautery. The generator and leads were carefully freed from the adhesive scar capsule. The leads appeared to be intact upon visual inspection.   The pacemaker generator was disconnected from the leads. A new generator was then connected to the chronic leads. The pocket was irrigated copiously with antibiotic solution. The generator was then placed into the pocket. The pocket was then closed in three layers using 2-O vicryl, 3-O monocryl, 4-O monocryl absorbable suture material and Dermabond. The skin was closed using a sub-cuticular technique. A bio-occlusive dressing were applied to the skin. The patient remained hemodynamically stable, tolerated the procedure well and was transferred in stable condition. There were no immediate complications encountered during the procedure. LEAD & GENERATOR DATA       Model # Serial #   Explanted Generator Medtronic [id number]   Implanted Generator Medtronic [id number]   Chronic Atrial Lead Medtronic [id number]   Chronic Ventricular Lead Medronic [id number]       PACE/SENSE DATA      Sensed Wave (mV) Threshold (V) Impedance (Ohms)   Atrium AF     Ventricle Evoked 1.0 515       FINAL PROGRAMMING     Mode Lower Rate (ppm) Upper Rate (ppm)   VVI 70 130       MEDICATION SUMMARY     Medication Route Unit Total   Gentamycin IV mg 80   Ancef IV grams 2   Fentanyl IV micrograms 25   Versed IV grams 3         CONCLUSIONS     1. Successful pacemaker generator change. 2. Keflex 500 mg po tid x 5 days. 3. Wound check in EP clinic in 7 days. 4. Follow up in EP clinic in 1 month or earlier if necessary. 5. Follow up with  Genet Haskins MD  as scheduled. Thank you, Genet Haskins MD for involving me in the care of this extraordinarily pleasant female.         Hudson Gonzalez MD  Cardiac Electrophysiology / Cardiology    [address], [address] [facility], [address]  [address]  [phone] / [phone] Fax [phone] / [phone] Fax

## 2018-05-03 NOTE — PROGRESS NOTES
11:31 AM  Patient arrived. ID and allergies verified verbally with patient. Pt voices understanding of procedure to be performed. Consent obtained. Pt prepped for procedure. 11:54 AM  TRANSFER - OUT REPORT:    Verbal report given to CRUZ RN(name) on [facility]  being transferred to EP LAB(unit) for ordered procedure   PM GENERATOR CHANGE    Report consisted of patients Situation, Background, Assessment and   Recommendations(SBAR). Information from the following report(s) SBAR was reviewed with the receiving nurse. Lines:   Peripheral IV 05/03/18 Left Forearm (Active)        Opportunity for questions and clarification was provided. Patient transported with:   Registered Nurse    12:30pm  TRANSFER - IN REPORT:    Verbal report received from Sahrlene Villlata RN(name) on [facility]  being received from EP Lab(unit) for routine progression of care      Report consisted of patients Situation, Background, Assessment and   Recommendations(SBAR). Information from the following report(s) Procedure Summary was reviewed with the receiving nurse. Opportunity for questions and clarification was provided. Assessment completed upon patients arrival to unit and care assumed.

## 2018-05-03 NOTE — DISCHARGE INSTRUCTIONS
Pacemaker Generator Change  Discharge Instructions    Please make sure you have received your Temporary Pacemaker identification card with your discharge instructions      MEDICATIONS         Take only the medications prescribed to you at discharge.  You are prescribed an antibiotic to take for 5 days. Please do not miss doses of this prescription. ACTIVITY         Return to your normal activity, except as noted below. o Avoid tight clothes or unnecessary pressure over your incision (such as bra straps or seat belts). If it is tender or sensitive to clothing, cover the incision with a soft dressing or pad.  o Questions about driving are individualized and should be discussed with one of the EP Physicians prior to discharge. SHOWERING         Leave the bandage over your incision for 7 days after the Pacemaker implant. You bandage will be removed in clinic in 7 days.  It is important to keep the bandaged area clean and dry. You may shower with the aquacel dressing in place as long as it is intact on all four sides. Do not point the direction of water directly at the site. Do not apply any lotions, powders, or perfumes to the incision line. If you have an aquacel dressing in place you may shower starting in 24 hours as long as the dressing is intact on all four sides and you do not point the flow of water directly at the site.  Avoid submerging your incision in water (tub baths, hot tubs, or swimming) for four weeks.  Underneath the dressing. o If you have white steri-strips over your incision (underneath the gauze dressing), they will curl up at the end and fall off, usually within 10 days. Do not pull them off.  - OR -   o You may have a different type of closure for the incision. If Dermabond Adhesive was used to close your incision, you will receive a separate instruction sheet.       DISCHARGE PRECAUTIONS         Record your temperature every day, at the same time, for 3 weeks after your implant. A temperature of 100.5 F, or higher, can be the first sign of infection. This should be reported to your Doctor immediately.  Always tell your doctor or dentist that you have a Pacemaker. Antibiotics may be prescribed before certain procedures.  If you use a cell phone, hold it on the opposite side from where your Pacemaker is implanted.  Your temporary identification will be given to you with these instructions. Keep your Pacemaker card in your wallet or on your person at all times. You should receive your permanent card in 8 weeks. If you do not receive your permanent card, please call the office at [phone]. TAKING YOUR PULSE         Take your pulse the same time every day, preferably in the morning.  Sit down and rest for 5 minutes prior to taking your pulse.  Take your pulse for 1 full minute, use a clock or stop watch with a second hand.  To feel your pulse, use the first two fingers of one hand; place them on the thumb side of the wrist of the opposite hand. The pulse will be steady, regular and throbbing.  Call the EP Lab Doctors if your pulse is less than 40 beats per minute. SYMPTOMS THAT NEED TO BE REPORTED IMMEDIATELY         Temperature more than 100.4 F     Redness or warmth at the incision site, or pain for longer than the first 5 days after the implant.  Drainage from the incision site.  Swelling around the incision site.  Shortness of breath.  Rapid heart rate or palpitations.  Dizziness, lightheadedness, fainting.  Slow pulse below 40 beats per minute.  REMEMBER: If you feel something is an emergency or cannot be handled over the phone, call 911 or go to the closest emergency room.           Chelsi Garcia MD  Cardiac Electrophysiology / Cardiology    [address] [place] [address] 647, [address]  [address] , [address] [place] [address] Sonya Siddiqui [address] [place] [address], [facility]  [phone] / [phone] Fax       [phone] / [phone] Fax            Pacemaker  Discharge Instructions    Please make sure you have received your Temporary Pacemaker identification card with your discharge instructions      MEDICATIONS         Take only the medications prescribed to you at discharge.  You are prescribed an antibiotic to take for 5 days. Please do not miss doses of this prescription. ACTIVITY         Return to your normal activity, except as noted below. o Avoid tight clothes or unnecessary pressure over your incision (such as bra straps or seat belts). If it is tender or sensitive to clothing, cover the incision with a soft dressing or pad.  o Questions about driving are individualized and should be discussed with one of the EP Physicians prior to discharge. SHOWERING         Leave the bandage over your incision for 7 days after the Pacemaker implant. You bandage will be removed in clinic in 7 days.  It is important to keep the bandaged area clean and dry. You may shower around the site until the bandage is removed in clinic. Thereafter, you may shower after the bandage is removed, washing it gently with soap and water. Do not apply any lotions, powders, or perfumes to the incision line.  Avoid submerging your incision in water (tub baths, hot tubs, or swimming) for four weeks.  Underneath the dressing. o If you have white steri-strips over your incision (underneath the gauze dressing), they will curl up at the end and fall off, usually within 10 days. Do not pull them off.  - OR -   o You may have a different type of closure for the incision. If Dermabond Adhesive was used to close your incision, you will receive a separate instruction sheet. DISCHARGE PRECAUTIONS         Record your temperature every day, at the same time, for 3 weeks after your implant.   A temperature of 100.5 F, or higher, can be the first sign of infection. This should be reported to your Doctor immediately.  You can have an MRI. You must be aware that any strong magnet or magnetic field can affect your Pacemaker. In general, be careful of metal detectors, heavy machinery, and any area where arc-welding is performed. Avoid metal detectors such as the ones in security checkpoints at [facility] or [address] [place] [address]. When approaching a security checkpoint show your Pacemaker ID Card to security personnel and ask to be hand searched.  Always tell your doctor or dentist that you have a Pacemaker. Antibiotics may be prescribed before certain procedures.  If you use a cell phone, hold it on the opposite side from where your Pacemaker is implanted.  Your temporary identification will be given to you with these instructions. Keep your Pacemaker card in your wallet or on your person at all times. You should receive your permanent card in 8 weeks. If you do not receive your permanent card, please call the office at [phone]. TAKING YOUR PULSE         Take your pulse the same time every day, preferably in the morning.  Sit down and rest for 5 minutes prior to taking your pulse.  Take your pulse for 1 full minute, use a clock or stop watch with a second hand.  To feel your pulse, use the first two fingers of one hand; place them on the thumb side of the wrist of the opposite hand. The pulse will be steady, regular and throbbing.  Call the EP Lab Doctors if your pulse is less than 40 beats per minute. SYMPTOMS THAT NEED TO BE REPORTED IMMEDIATELY         Temperature more than 100.4 F     Redness or warmth at the incision site, or pain for longer than the first 5 days after the implant.  Drainage from the incision site.  Swelling around the incision site.  Shortness of breath.  Rapid heart rate or palpitations.      Dizziness, lightheadedness, fainting.  Slow pulse below 40 beats per minute.  REMEMBER: If you feel something is an emergency or cannot be handled over the phone, call 911 or go to the closest emergency room.     FOLLOW UP APPOINTMENT WITH DR Oziel Pacheco IN 1 WEEK      Santi Quijano MD  Cardiac Electrophysiology / Cardiology    [facility].  [address] [facility], [address] [facility], [address]  [address]  [phone] / [phone] Fax       [phone] / [phone] Fax

## 2018-05-14 ENCOUNTER — OFFICE VISIT (OUTPATIENT)
Dept: CARDIOLOGY CLINIC | Age: 83
End: 2018-05-14

## 2018-05-14 VITALS
SYSTOLIC BLOOD PRESSURE: 120 MMHG | WEIGHT: 142 LBS | HEIGHT: 63 IN | DIASTOLIC BLOOD PRESSURE: 58 MMHG | HEART RATE: 64 BPM | BODY MASS INDEX: 25.16 KG/M2

## 2018-05-14 DIAGNOSIS — Z95.0 PACEMAKER: Primary | ICD-10-CM

## 2018-05-14 DIAGNOSIS — Z51.89 VISIT FOR WOUND CHECK: ICD-10-CM

## 2018-05-14 NOTE — PROGRESS NOTES
Patient presents for wound check post-device generator change. The dressing was removed and the site was inspected. The site appeared to be well-healing without ecchymosis/tenderness/erythema. Denies pain, fevers, discharge. Plan:    Continue follow up in device clinic as planned.        Viry Muñoz NP

## 2018-06-08 ENCOUNTER — OFFICE VISIT (OUTPATIENT)
Dept: CARDIOLOGY CLINIC | Age: 83
End: 2018-06-08

## 2018-06-08 ENCOUNTER — CLINICAL SUPPORT (OUTPATIENT)
Dept: CARDIOLOGY CLINIC | Age: 83
End: 2018-06-08

## 2018-06-08 VITALS
HEIGHT: 63 IN | SYSTOLIC BLOOD PRESSURE: 148 MMHG | WEIGHT: 144 LBS | DIASTOLIC BLOOD PRESSURE: 70 MMHG | BODY MASS INDEX: 25.52 KG/M2 | RESPIRATION RATE: 16 BRPM | OXYGEN SATURATION: 97 % | HEART RATE: 67 BPM

## 2018-06-08 DIAGNOSIS — Z95.0 PACEMAKER: Primary | ICD-10-CM

## 2018-06-08 NOTE — PROGRESS NOTES
Visit Vitals    /70    Pulse 67    Resp 16    Ht 5' 3\" (1.6 m)    Wt 144 lb (65.3 kg)    SpO2 97%    BMI 25.51 kg/m2

## 2018-06-08 NOTE — PROGRESS NOTES
HISTORY OF PRESENTING ILLNESS      Chipper Blizzard is a 81 y.o. female with history of pacemaker, diabetes mellitus, dyslipidemia, atrial fibrillation, hypertension, complete heart block who underwent pacemaker generator change and now presents for follow-up. Device interrogation reveals that the device is still initializing. Her incision had persistent Dermabond in place which was removed. The lateral aspect of her incision had a retained strip of Monocryl which was removed. Steri-Strips were placed in this area. ACTIVE PROBLEM LIST     Patient Active Problem List    Diagnosis Date Noted    Elective replacement indicated for pacemaker 04/26/2018    Pacemaker 04/25/2018    Complete heart block ([address] 04/15/2011    AF (atrial fibrillation) ([address] 04/15/2011    HTN (hypertension), benign 04/15/2011    Orthostatic hypotension 04/15/2011           PAST MEDICAL HISTORY     Past Medical History:   Diagnosis Date    Atrial fibrillation ([address]     Cataract     Diabetes ([address]     Hypercholesterolemia     Hypertension     Pacemaker     Metronic           PAST SURGICAL HISTORY     Past Surgical History:   Procedure Laterality Date    COLONOSCOPY  4/14/2016         HX AFIB ABLATION      HX COLONOSCOPY      HX PACEMAKER  2007    Medtronic 2007          ALLERGIES     No Known Allergies       FAMILY HISTORY     No family history on file.  negative for cardiac disease       SOCIAL HISTORY     Social History     Social History    Marital status: SINGLE     Spouse name: N/A    Number of children: N/A    Years of education: N/A     Social History Main Topics    Smoking status: Never Smoker    Smokeless tobacco: Never Used    Alcohol use No    Drug use: No    Sexual activity: Not on file     Other Topics Concern    Not on file     Social History Narrative         MEDICATIONS     Current Outpatient Prescriptions   Medication Sig    hydroCHLOROthiazide (HYDRODIURIL) 50 mg tablet Take 50 mg by mouth daily.  labetalol (NORMODYNE) 200 mg tablet Take  by mouth two (2) times a day.  lisinopril (PRINIVIL, ZESTRIL) 5 mg tablet Take  by mouth daily.  atorvastatin (LIPITOR) 40 mg tablet Take  by mouth daily.  cholecalciferol, vitamin D3, (VITAMIN D3) 2,000 unit tab Take  by mouth daily.  cyanocobalamin 1,000 mcg tablet Take 1,000 mcg by mouth daily.  FERROUS SULFATE PO Take 28 mg by mouth daily.  vit C/E/Zn/coppr/lutein/zeaxan (PRESERVISION AREDS 2 PO) Take  by mouth two (2) times a day.  amLODIPine (NORVASC) 2.5 mg tablet Take 2.5 mg by mouth two (2) times a day.  metFORMIN (GLUCOPHAGE) 500 mg tablet Take 500 mg by mouth three (3) times daily (with meals).  DOCOSAHEXANOIC ACID/EPA (FISH OIL PO) Take 3,000 mg by mouth daily.  DABIGATRAN ETEXILATE MESYLATE (PRADAXA PO) Take 150 mg by mouth two (2) times a day.  acarbose (PRECOSE) 50 mg tablet Take 50 mg by mouth two (2) times daily (with meals). No current facility-administered medications for this visit. I have reviewed the nurses notes, vitals, problem list, allergy list, medical history, family, social history and medications. REVIEW OF SYMPTOMS      General: Pt denies excessive weight gain or loss. Pt is able to conduct ADL's  HEENT: Denies blurred vision, headaches, hearing loss, epistaxis and difficulty swallowing. Respiratory: Denies cough, congestion, shortness of breath, SANTOS, wheezing or stridor. Cardiovascular: Denies precordial pain, palpitations, edema or PND  Gastrointestinal: Denies poor appetite, indigestion, abdominal pain or blood in stool  Genitourinary: Denies hematuria, dysuria, increased urinary frequency  Musculoskeletal: Denies joint pain or swelling from muscles or joints  Neurologic: Denies tremor, paresthesias, headache, or sensory motor disturbance  Psychiatric: Denies confusion, insomnia, depression  Integumentray: Denies rash, itching or ulcers.   Hematologic: Denies easy bruising, bleeding       PHYSICAL EXAMINATION      There were no vitals filed for this visit. General: Well developed, in no acute distress. HEENT: No jaundice, oral mucosa moist, no oral ulcers  Neck: Supple, no stiffness, no lymphadenopathy, supple  Heart:  Normal S1/S2 negative S3 or S4. Regular, no murmur, gallop or rub, no jugular venous distention  Respiratory: Clear bilaterally x 4, no wheezing or rales  Abdomen:   Soft, non-tender, bowel sounds are active.   Extremities:  No edema, normal cap refill, no cyanosis. Musculoskeletal: No clubbing, no deformities  Neuro: A&Ox3, speech clear, gait stable, cooperative, no focal neurologic deficits  Skin: Skin color is normal. No rashes or lesions. Non diaphoretic, moist.  Vascular: 2+ pulses symmetric in all extremities       DIAGNOSTIC DATA      EKG:        LABORATORY DATA      Lab Results   Component Value Date/Time    WBC 7.4 04/25/2018 11:31 AM    HGB 13.0 04/25/2018 11:31 AM    HCT 39.1 04/25/2018 11:31 AM    PLATELET 560 15/32/2335 11:31 AM    MCV 90 04/25/2018 11:31 AM      Lab Results   Component Value Date/Time    Sodium 143 04/25/2018 11:31 AM    Potassium 4.3 04/25/2018 11:31 AM    Chloride 102 04/25/2018 11:31 AM    CO2 23 04/25/2018 11:31 AM    Anion gap 8 07/27/2009 09:00 AM    Glucose 123 (H) 04/25/2018 11:31 AM    BUN 15 04/25/2018 11:31 AM    Creatinine 0.77 04/25/2018 11:31 AM    BUN/Creatinine ratio 19 04/25/2018 11:31 AM    GFR est AA 83 04/25/2018 11:31 AM    GFR est non-AA 72 04/25/2018 11:31 AM    Calcium 9.0 04/25/2018 11:31 AM    Bilirubin, total 0.4 07/27/2009 09:00 AM    AST (SGOT) 35 07/27/2009 09:00 AM    Alk. phosphatase 88 07/27/2009 09:00 AM    Protein, total 6.9 07/27/2009 09:00 AM    Albumin 3.9 07/27/2009 09:00 AM    Globulin 3.0 07/27/2009 09:00 AM    A-G Ratio 1.3 07/27/2009 09:00 AM    ALT (SGPT) 40 07/27/2009 09:00 AM           ASSESSMENT      1.  Pacemaker                        A.  Left-sided                        P. Medtronic                        C. Pacemaker dependent  2.  Atrial fibrillation  3.  Hypertension  4.  Dyslipidemia  5.  Complete heart block       PLAN     Follow-up in 2 weeks for a wound check      Thank you, Melissa Zamora MD and Dr. Andrés Felix for allowing me to participate in the care of this extraordinarily pleasant female. Please do not hesitate to contact me for further questions/concerns.          Maryam Anaya MD  Cardiac Electrophysiology / Cardiology    [facility] 92.  [address] [place] [address], [facility], [place] [address]  Sonya Pimentel[address] StreetJonel ingramHCA Midwest Division  [phone] / [phone] Fax   [phone] / [phone] Fax

## 2018-06-08 NOTE — MR AVS SNAPSHOT
[address] 
[phone] Patient: Nita Woodward MRN: [id number] Visit Information Date & Time Provider Department Dept. Phone Encounter #  
 2018 11:40 AM Kevin Jiménez MD [facility] [address] [phone] [id number] Your Appointments 2018  3:00 PM  
ESTABLISHED PATIENT with Kevin Jiménez MD  
[facility] Appt Note: per dr carrasco 2 week fup  
 [address]  
[address] [address] [address] Upcoming Health Maintenance Date Due DTaP/Tdap/Td series (1 - Tdap) 1956 ZOSTER VACCINE AGE 60> 1995 GLAUCOMA SCREENING Q2Y 2001 Bone Densitometry (Dexa) Screening 2001 Pneumococcal 65+ Low/Medium Risk (1 of 2 - PCV13) 2001 MEDICARE YEARLY EXAM 3/14/2018 Influenza Age 5 to Adult 2018 Allergies as of 2018  Review Complete On: 2018 By: Tara Mcfadden RN No Known Allergies Current Immunizations  Never Reviewed No immunizations on file. Not reviewed this visit Vitals BP Pulse Resp Height(growth percentile) Weight(growth percentile) SpO2  
 148/70 67 16 5' 3\" (1.6 m) 144 lb (65.3 kg) 97% BMI OB Status Smoking Status 25.51 kg/m2 Postmenopausal Never Smoker Vitals History BMI and BSA Data Body Mass Index Body Surface Area 25.51 kg/m 2 1.7 m 2 Preferred Pharmacy Pharmacy Name Phone [facility] [address] [facility] [phone] Your Updated Medication List  
  
   
This list is accurate as of 18 12:13 PM.  Always use your most recent med list. amLODIPine 2.5 mg tablet Commonly known as:  [employer] Take 2.5 mg by mouth two (2) times a day. cyanocobalamin 1,000 mcg tablet Take 1,000 mcg by mouth daily. FERROUS SULFATE PO Take 28 mg by mouth daily. FISH OIL PO Take 3,000 mg by mouth daily. hydroCHLOROthiazide 50 mg tablet Commonly known as:  HYDRODIURIL Take 50 mg by mouth daily. labetalol 200 mg tablet Commonly known as:  Dia Lao Take  by mouth two (2) times a day. LIPITOR 40 mg tablet Generic drug:  atorvastatin Take  by mouth daily. lisinopril 5 mg tablet Commonly known as:  Tildon Robbin Take  by mouth daily. metFORMIN 500 mg tablet Commonly known as:  GLUCOPHAGE Take 500 mg by mouth three (3) times daily (with meals). PRADAXA PO Take 150 mg by mouth two (2) times a day. PRECOSE 50 mg tablet Generic drug:  acarbose Take 50 mg by mouth two (2) times daily (with meals). PRESERVISION AREDS 2 PO Take  by mouth two (2) times a day. VITAMIN D3 2,000 unit Tab Generic drug:  cholecalciferol (vitamin D3) Take  by mouth daily. Introducing [facility]! [employer] introduces [employer] patient portal. Now you can access parts of your medical record, email your doctor's office, and request medication refills online. 1. In your internet browser, go to [email or web address] 2. Click on the First Time User? Click Here link in the Sign In box. You will see the New Member Sign Up page. 3. Enter your [employer] Access Code exactly as it appears below. You will not need to use this code after youve completed the sign-up process. If you do not sign up before the expiration date, you must request a new code. · [employer] Access Code: [email or web address] Expires: 7/29/2018  3:49 PM 
 
4. Enter the last four digits of your Social Security Number (xxxx) and Date of Birth (mm/dd/yyyy) as indicated and click Submit. You will be taken to the next sign-up page. 5. Create a Cover ID. This will be your Cover login ID and cannot be changed, so think of one that is secure and easy to remember. 6. Create a Cover password. You can change your password at any time. 7. Enter your Password Reset Question and Answer. This can be used at a later time if you forget your password. 8. Enter your e-mail address. You will receive e-mail notification when new information is available in [address] 9. Click Sign Up. You can now view and download portions of your medical record. 10. Click the Download Summary menu link to download a portable copy of your medical information. If you have questions, please visit the Frequently Asked Questions section of the Cover website. Remember, Cover is NOT to be used for urgent needs. For medical emergencies, dial 911. Now available from your iPhone and Android! Please provide this summary of care documentation to your next provider. Your primary care clinician is listed as Danyell Curry. If you have any questions after today's visit, please call [phone].

## 2018-06-27 ENCOUNTER — OFFICE VISIT (OUTPATIENT)
Dept: CARDIOLOGY CLINIC | Age: 83
End: 2018-06-27

## 2018-06-27 DIAGNOSIS — Z51.89 VISIT FOR WOUND CHECK: ICD-10-CM

## 2018-06-27 DIAGNOSIS — Z95.0 PACEMAKER: Primary | ICD-10-CM

## 2018-06-27 NOTE — PROGRESS NOTES
HISTORY OF PRESENTING ILLNESS      Lucrecia Gomez is a 81 y.o. female with female with history of pacemaker, diabetes mellitus, dyslipidemia, atrial fibrillation, hypertension, complete heart block who underwent pacemaker generator change and now presents for follow-up for incision check. The lateral aspect of her incision had a retained strip of Monocryl which was removed. Site is healing but there is still a small piece of retained Monocryl. She denies complaints. No fever, erythema or tenderness. ACTIVE PROBLEM LIST     Patient Active Problem List    Diagnosis Date Noted    Elective replacement indicated for pacemaker 04/26/2018    Pacemaker 04/25/2018    Complete heart block ([address] 04/15/2011    AF (atrial fibrillation) ([address] 04/15/2011    HTN (hypertension), benign 04/15/2011    Orthostatic hypotension 04/15/2011           PAST MEDICAL HISTORY     Past Medical History:   Diagnosis Date    Atrial fibrillation ([address]     Cataract     Diabetes ([address]     Hypercholesterolemia     Hypertension     Pacemaker     Metronic generator changed on 5/3/18           PAST SURGICAL HISTORY     Past Surgical History:   Procedure Laterality Date    COLONOSCOPY  4/14/2016         HX AFIB ABLATION      HX COLONOSCOPY      HX PACEMAKER  2007    Medtronic 2007/gen change 5/3/18          ALLERGIES     No Known Allergies       FAMILY HISTORY     No family history on file.  negative for cardiac disease       SOCIAL HISTORY     Social History     Social History    Marital status: SINGLE     Spouse name: N/A    Number of children: N/A    Years of education: N/A     Social History Main Topics    Smoking status: Never Smoker    Smokeless tobacco: Never Used    Alcohol use No    Drug use: No    Sexual activity: Not on file     Other Topics Concern    Not on file     Social History Narrative         MEDICATIONS     Current Outpatient Prescriptions   Medication Sig    hydroCHLOROthiazide (HYDRODIURIL) 50 mg tablet Take 50 mg by mouth daily.  labetalol (NORMODYNE) 200 mg tablet Take  by mouth two (2) times a day.  lisinopril (PRINIVIL, ZESTRIL) 5 mg tablet Take  by mouth daily.  atorvastatin (LIPITOR) 40 mg tablet Take  by mouth daily.  cholecalciferol, vitamin D3, (VITAMIN D3) 2,000 unit tab Take  by mouth daily.  cyanocobalamin 1,000 mcg tablet Take 1,000 mcg by mouth daily.  FERROUS SULFATE PO Take 28 mg by mouth daily.  vit C/E/Zn/coppr/lutein/zeaxan (PRESERVISION AREDS 2 PO) Take  by mouth two (2) times a day.  amLODIPine (NORVASC) 2.5 mg tablet Take 2.5 mg by mouth two (2) times a day.  metFORMIN (GLUCOPHAGE) 500 mg tablet Take 500 mg by mouth three (3) times daily (with meals).  DOCOSAHEXANOIC ACID/EPA (FISH OIL PO) Take 3,000 mg by mouth daily.  DABIGATRAN ETEXILATE MESYLATE (PRADAXA PO) Take 150 mg by mouth two (2) times a day.  acarbose (PRECOSE) 50 mg tablet Take 50 mg by mouth two (2) times daily (with meals). No current facility-administered medications for this visit. I have reviewed the nurses notes, vitals, problem list, allergy list, medical history, family, social history and medications. REVIEW OF SYMPTOMS      General: Pt denies excessive weight gain or loss. Pt is able to conduct ADL's  HEENT: Denies blurred vision, headaches, hearing loss, epistaxis and difficulty swallowing. Respiratory: Denies cough, congestion, shortness of breath, SANTOS, wheezing or stridor.   Cardiovascular: Denies precordial pain, palpitations, edema or PND  Gastrointestinal: Denies poor appetite, indigestion, abdominal pain or blood in stool  Genitourinary: Denies hematuria, dysuria, increased urinary frequency  Musculoskeletal: Denies joint pain or swelling from muscles or joints  Neurologic: Denies tremor, paresthesias, headache, or sensory motor disturbance  Psychiatric: Denies confusion, insomnia, depression  Integumentray: Denies rash, itching or ulcers. Hematologic: Denies easy bruising, bleeding       PHYSICAL EXAMINATION      There were no vitals filed for this visit. General: Well developed, in no acute distress. HEENT: No jaundice, oral mucosa moist, no oral ulcers  Neck: Supple, no stiffness, no lymphadenopathy, supple  Heart:  Normal S1/S2 negative S3 or S4. Regular, no murmur, gallop or rub, no jugular venous distention  Respiratory: Clear bilaterally x 4, no wheezing or rales  Abdomen:   Soft, non-tender, bowel sounds are active.   Extremities:  No edema, normal cap refill, no cyanosis. Musculoskeletal: No clubbing, no deformities  Neuro: A&Ox3, speech clear, gait stable, cooperative, no focal neurologic deficits  Skin: Skin color is normal. No rashes or lesions. Non diaphoretic, moist.  Vascular: 2+ pulses symmetric in all extremities       DIAGNOSTIC DATA      EKG:        LABORATORY DATA      Lab Results   Component Value Date/Time    WBC 7.4 04/25/2018 11:31 AM    HGB 13.0 04/25/2018 11:31 AM    HCT 39.1 04/25/2018 11:31 AM    PLATELET 095 33/60/6813 11:31 AM    MCV 90 04/25/2018 11:31 AM      Lab Results   Component Value Date/Time    Sodium 143 04/25/2018 11:31 AM    Potassium 4.3 04/25/2018 11:31 AM    Chloride 102 04/25/2018 11:31 AM    CO2 23 04/25/2018 11:31 AM    Anion gap 8 07/27/2009 09:00 AM    Glucose 123 (H) 04/25/2018 11:31 AM    BUN 15 04/25/2018 11:31 AM    Creatinine 0.77 04/25/2018 11:31 AM    BUN/Creatinine ratio 19 04/25/2018 11:31 AM    GFR est AA 83 04/25/2018 11:31 AM    GFR est non-AA 72 04/25/2018 11:31 AM    Calcium 9.0 04/25/2018 11:31 AM    Bilirubin, total 0.4 07/27/2009 09:00 AM    AST (SGOT) 35 07/27/2009 09:00 AM    Alk. phosphatase 88 07/27/2009 09:00 AM    Protein, total 6.9 07/27/2009 09:00 AM    Albumin 3.9 07/27/2009 09:00 AM    Globulin 3.0 07/27/2009 09:00 AM    A-G Ratio 1.3 07/27/2009 09:00 AM    ALT (SGPT) 40 07/27/2009 09:00 AM           ASSESSMENT      1. Pacemaker                        A.  Left-sided                        V. Medtronic                        C. Pacemaker dependent  2.  Atrial fibrillation  3.  Hypertension  4.  Dyslipidemia  5.  Complete heart block     PLAN     Follow up in 2 weeks for wound check. FOLLOW-UP       Thank you, Irma Camara MD and Dr. Aracely Isbell for allowing me to participate in the care of this extraordinarily pleasant female. Please do not hesitate to contact me for further questions/concerns.      Wallie [place], NP    [facility].  [address] [place], [place] [address]  [phone] / [phone] Fax   [phone] / [phone] Fax

## 2018-07-11 ENCOUNTER — OFFICE VISIT (OUTPATIENT)
Dept: CARDIOLOGY CLINIC | Age: 83
End: 2018-07-11

## 2018-07-11 VITALS
WEIGHT: 143.8 LBS | HEART RATE: 68 BPM | SYSTOLIC BLOOD PRESSURE: 120 MMHG | OXYGEN SATURATION: 97 % | DIASTOLIC BLOOD PRESSURE: 60 MMHG | HEIGHT: 63 IN | BODY MASS INDEX: 25.48 KG/M2 | RESPIRATION RATE: 16 BRPM

## 2018-07-11 DIAGNOSIS — Z95.0 PACEMAKER: Primary | ICD-10-CM

## 2018-07-11 NOTE — PROGRESS NOTES
Visit Vitals    /60 (BP 1 Location: Left arm, BP Patient Position: Sitting)    Pulse 68    Resp 16    Ht 5' 3\" (1.6 m)    Wt 143 lb 12.8 oz (65.2 kg)    SpO2 97%    BMI 25.47 kg/m2

## 2018-07-11 NOTE — PROGRESS NOTES
HISTORY OF PRESENTING ILLNESS      Gualberto Qiu is a 81 y.o. female with female with history of pacemaker, diabetes mellitus, dyslipidemia, atrial fibrillation, hypertension, complete heart block who underwent pacemaker generator change who was noted to have a retained piece of monocryl suture at her incision site was removed. She presents for re-evaluation of her incision site. Her incision has healed well and she denies fevers, drainage, pain at the site. ACTIVE PROBLEM LIST     Patient Active Problem List    Diagnosis Date Noted    Elective replacement indicated for pacemaker 04/26/2018    Pacemaker 04/25/2018    Complete heart block ([address] 04/15/2011    AF (atrial fibrillation) ([address] 04/15/2011    HTN (hypertension), benign 04/15/2011    Orthostatic hypotension 04/15/2011           PAST MEDICAL HISTORY     Past Medical History:   Diagnosis Date    Atrial fibrillation ([address]     Cataract     Diabetes ([address]     Hypercholesterolemia     Hypertension     Pacemaker     Metronic generator changed on 5/3/18           PAST SURGICAL HISTORY     Past Surgical History:   Procedure Laterality Date    COLONOSCOPY  4/14/2016         HX AFIB ABLATION      HX COLONOSCOPY      HX PACEMAKER  2007    Medtronic 2007/gen change 5/3/18          ALLERGIES     No Known Allergies       FAMILY HISTORY     No family history on file. negative for cardiac disease       SOCIAL HISTORY     Social History     Social History    Marital status: SINGLE     Spouse name: N/A    Number of children: N/A    Years of education: N/A     Social History Main Topics    Smoking status: Never Smoker    Smokeless tobacco: Never Used    Alcohol use No    Drug use: No    Sexual activity: Not on file     Other Topics Concern    Not on file     Social History Narrative         MEDICATIONS     Current Outpatient Prescriptions   Medication Sig    hydroCHLOROthiazide (HYDRODIURIL) 50 mg tablet Take 50 mg by mouth daily.  labetalol (NORMODYNE) 200 mg tablet Take  by mouth two (2) times a day.  lisinopril (PRINIVIL, ZESTRIL) 5 mg tablet Take  by mouth daily.  atorvastatin (LIPITOR) 40 mg tablet Take  by mouth daily.  cholecalciferol, vitamin D3, (VITAMIN D3) 2,000 unit tab Take  by mouth daily.  cyanocobalamin 1,000 mcg tablet Take 1,000 mcg by mouth daily.  FERROUS SULFATE PO Take 28 mg by mouth daily.  vit C/E/Zn/coppr/lutein/zeaxan (PRESERVISION AREDS 2 PO) Take  by mouth two (2) times a day.  amLODIPine (NORVASC) 2.5 mg tablet Take 2.5 mg by mouth two (2) times a day.  metFORMIN (GLUCOPHAGE) 500 mg tablet Take 500 mg by mouth three (3) times daily (with meals).  DOCOSAHEXANOIC ACID/EPA (FISH OIL PO) Take 3,000 mg by mouth daily.  DABIGATRAN ETEXILATE MESYLATE (PRADAXA PO) Take 150 mg by mouth two (2) times a day.  acarbose (PRECOSE) 50 mg tablet Take 50 mg by mouth two (2) times daily (with meals). No current facility-administered medications for this visit. I have reviewed the nurses notes, vitals, problem list, allergy list, medical history, family, social history and medications. REVIEW OF SYMPTOMS      General: Pt denies excessive weight gain or loss. Pt is able to conduct ADL's  HEENT: Denies blurred vision, headaches, hearing loss, epistaxis and difficulty swallowing. Respiratory: Denies cough, congestion, shortness of breath, SANTOS, wheezing or stridor. Cardiovascular: Denies precordial pain, palpitations, edema or PND  Gastrointestinal: Denies poor appetite, indigestion, abdominal pain or blood in stool  Genitourinary: Denies hematuria, dysuria, increased urinary frequency  Musculoskeletal: Denies joint pain or swelling from muscles or joints  Neurologic: Denies tremor, paresthesias, headache, or sensory motor disturbance  Psychiatric: Denies confusion, insomnia, depression  Integumentray: Denies rash, itching or ulcers.   Hematologic: Denies easy bruising, bleeding       PHYSICAL EXAMINATION      There were no vitals filed for this visit. General: Well developed, in no acute distress. HEENT: No jaundice, oral mucosa moist, no oral ulcers  Neck: Supple, no stiffness, no lymphadenopathy, supple  Heart:  Normal S1/S2 negative S3 or S4. Regular, no murmur, gallop or rub, no jugular venous distention  Respiratory: Clear bilaterally x 4, no wheezing or rales  Abdomen:   Soft, non-tender, bowel sounds are active.   Extremities:  No edema, normal cap refill, no cyanosis. Musculoskeletal: No clubbing, no deformities  Neuro: A&Ox3, speech clear, gait stable, cooperative, no focal neurologic deficits  Skin: Skin color is normal. No rashes or lesions. Non diaphoretic, moist.  Vascular: 2+ pulses symmetric in all extremities       DIAGNOSTIC DATA      EKG:        LABORATORY DATA      Lab Results   Component Value Date/Time    WBC 7.4 04/25/2018 11:31 AM    HGB 13.0 04/25/2018 11:31 AM    HCT 39.1 04/25/2018 11:31 AM    PLATELET 412 55/01/5548 11:31 AM    MCV 90 04/25/2018 11:31 AM      Lab Results   Component Value Date/Time    Sodium 143 04/25/2018 11:31 AM    Potassium 4.3 04/25/2018 11:31 AM    Chloride 102 04/25/2018 11:31 AM    CO2 23 04/25/2018 11:31 AM    Anion gap 8 07/27/2009 09:00 AM    Glucose 123 (H) 04/25/2018 11:31 AM    BUN 15 04/25/2018 11:31 AM    Creatinine 0.77 04/25/2018 11:31 AM    BUN/Creatinine ratio 19 04/25/2018 11:31 AM    GFR est AA 83 04/25/2018 11:31 AM    GFR est non-AA 72 04/25/2018 11:31 AM    Calcium 9.0 04/25/2018 11:31 AM    Bilirubin, total 0.4 07/27/2009 09:00 AM    AST (SGOT) 35 07/27/2009 09:00 AM    Alk. phosphatase 88 07/27/2009 09:00 AM    Protein, total 6.9 07/27/2009 09:00 AM    Albumin 3.9 07/27/2009 09:00 AM    Globulin 3.0 07/27/2009 09:00 AM    A-G Ratio 1.3 07/27/2009 09:00 AM    ALT (SGPT) 40 07/27/2009 09:00 AM           ASSESSMENT      1.  Pacemaker                        A.  Left-sided                        G. Medtronic                        C. Pacemaker dependent  2.  Atrial fibrillation  3.  Hypertension  4.  Dyslipidemia  5.  Complete heart block       PLAN     Continue follow up in device clinic. Thank you, Dina Purcell MD and Dr. Karl Hightower for allowing me to participate in the care of this extraordinarily pleasant female. Please do not hesitate to contact me for further questions/concerns.          Keyon Olsen MD  Cardiac Electrophysiology / Cardiology    [facility].  [address] [facility], [address], [facility]    Clarissa Owens  [phone] / [phone] Fax   [phone] / [phone] Fax

## 2018-09-04 ENCOUNTER — TELEPHONE (OUTPATIENT)
Dept: CARDIOLOGY CLINIC | Age: 83
End: 2018-09-04

## 2018-09-04 NOTE — TELEPHONE ENCOUNTER
Patient states she understands she has a remote check scheduled for 9/18 but she doesn't understand what to do so she would rather come in.    PHONE: [phone]

## 2018-11-27 ENCOUNTER — HOSPITAL ENCOUNTER (EMERGENCY)
Age: 83
Discharge: HOME | End: 2018-11-27
Attending: EMERGENCY MEDICINE
Payer: MEDICARE

## 2018-11-27 VITALS
HEIGHT: 63 IN | OXYGEN SATURATION: 98 % | TEMPERATURE: 97.3 F | DIASTOLIC BLOOD PRESSURE: 70 MMHG | HEART RATE: 70 BPM | RESPIRATION RATE: 18 BRPM | WEIGHT: 145 LBS | BODY MASS INDEX: 25.69 KG/M2 | SYSTOLIC BLOOD PRESSURE: 163 MMHG

## 2018-11-27 DIAGNOSIS — I10 ESSENTIAL HYPERTENSION: ICD-10-CM

## 2018-11-27 DIAGNOSIS — R04.0 LEFT-SIDED EPISTAXIS: Primary | ICD-10-CM

## 2018-11-27 PROCEDURE — 74011250637 HC RX REV CODE- 250/637: Performed by: EMERGENCY MEDICINE

## 2018-11-27 PROCEDURE — 99283 EMERGENCY DEPT VISIT LOW MDM: CPT

## 2018-11-27 RX ORDER — OXYMETAZOLINE HCL 0.05 %
2 SPRAY, NON-AEROSOL (ML) NASAL
Status: COMPLETED | OUTPATIENT
Start: 2018-11-27 | End: 2018-11-27

## 2018-11-27 RX ORDER — OXYMETAZOLINE HCL 0.05 %
2 SPRAY, NON-AEROSOL (ML) NASAL 2 TIMES DAILY
Qty: 1 EACH | Refills: 0 | Status: SHIPPED
Start: 2018-11-27 | End: 2018-11-30

## 2018-11-27 RX ADMIN — Medication 2 SPRAY: at 17:04

## 2018-11-27 NOTE — ED PROVIDER NOTES
81 y.o. female with past medical history significant for diabetes, hypertension, hypercholesterolemia, a-fib, pacemaker in place, and cataracts who presents from home with chief complaint of epistaxis. Pt states that she had a nosebleed today ~2 hours ago. She checked her blood pressure after and it was 167/71. She states that she wanted to have her BP checked again. Pt's BP medications are prescribed by her PCP and she states that she had her medications changed ~1-2 months ago and her BP has been well controlled. Pt denies fever chills, vision changes, chest pain, urinary sx, headache, lightheadedness, dizziness. There are no other acute medical concerns at this time. Social hx: no tobacco use; no EtOH use PCP: Benito Valerio MD 
 
Note written by Donte Graham Mt, as dictated by Sada Raza MD 4:34 PM 
 
 
The history is provided by the patient and a relative. Past Medical History:  
Diagnosis Date  Atrial fibrillation ([facility]  Cataract  Diabetes ([facility]  Hypercholesterolemia  Hypertension  Pacemaker Metronic generator changed on 5/3/18 Past Surgical History:  
Procedure Laterality Date  COLONOSCOPY  4/14/2016  HX AFIB ABLATION    
 HX COLONOSCOPY    
 HX PACEMAKER  2007 Medtronic 2007/gen change 5/3/18 No family history on file. Social History Socioeconomic History  Marital status: SINGLE Spouse name: Not on file  Number of children: Not on file  Years of education: Not on file  Highest education level: Not on file Social Needs  Financial resource strain: Not on file  Food insecurity - worry: Not on file  Food insecurity - inability: Not on file  Transportation needs - medical: Not on file  Transportation needs - non-medical: Not on file Occupational History  Not on file Tobacco Use  Smoking status: Never Smoker  Smokeless tobacco: Never Used Substance and Sexual Activity  Alcohol use: No  
 Drug use: No  
 Sexual activity: Not on file Other Topics Concern  Not on file Social History Narrative  Not on file ALLERGIES: Patient has no known allergies. Review of Systems Constitutional: Negative for chills, fatigue and fever. HENT: Positive for nosebleeds. Eyes: Negative for visual disturbance. Respiratory: Negative for shortness of breath. Cardiovascular: Negative for chest pain. Gastrointestinal: Negative for abdominal pain. Neurological: Negative for dizziness, weakness, light-headedness and headaches. All other systems reviewed and are negative. Vitals:  
 11/27/18 1634 11/27/18 1718 BP: 163/70 Pulse: 70 Resp: 18 Temp: 97.3 °F (36.3 °C) SpO2: 98% 98% Weight: 65.8 kg (145 lb) Height: 5' 3\" (1.6 m) Physical Exam  
Constitutional: She appears well-developed and well-nourished. No distress. HENT:  
Head: Normocephalic and atraumatic. No septal hematoma. Dry blood on the anterior septal mucosa with mucosal irritation. Eyes: Conjunctivae are normal.  
Neck: Neck supple. Cardiovascular: Normal rate and regular rhythm. Pulmonary/Chest: Effort normal. No respiratory distress. Abdominal: She exhibits no distension. Musculoskeletal: Normal range of motion. She exhibits no deformity. Neurological: She is alert. No cranial nerve deficit. Skin: Skin is warm and dry. Psychiatric: Her behavior is normal.  
Nursing note and vitals reviewed. Note written by Donte Hale, as dictated by Sai Layne MD 4:38 PM  
 
MDM 
  
 
81 y.o. female presents with bleeding from nose which appears to have been self-limited. No signs of anemia or other complicating feature. Recommended direct pressure and afrin for refractory bleeding for no longer than 3 days total. Return precautions discussed for uncontrolled bleeding.   She is concerned about her hypertension which is likely secondary to her anxiety following the nosebleed and we discussed routine follow up with her primary care physician for this. Procedures PROGRESS NOTE: 
4:39 PM 
Will start on Afrin for 3 days in the left nostril.  
 
5:00 PM 
Patient's results have been reviewed with them. Patient and/or family have verbally conveyed their understanding and agreement of the patient's signs, symptoms, diagnosis, treatment and prognosis and additionally agree to follow up as recommended or return to the Emergency Room should their condition change prior to follow-up. Discharge instructions have also been provided to the patient with some educational information regarding their diagnosis as well a list of reasons why they would want to return to the ER prior to their follow-up appointment should their condition change.

## 2018-11-27 NOTE — DISCHARGE INSTRUCTIONS
Nosebleeds: After Your Visit to the Emergency Room  Your Care Instructions  Many things can cause a nosebleed. The doctor has stopped the bleeding. If you have gauze or other packing materials in your nose, you will need to follow up with your doctor to have the packing removed. You may need more treatment if you get nosebleeds a lot. Even though you have been released from the emergency room, you still need to watch for any problems. The doctor carefully checked you. But sometimes problems can develop later. If you have new symptoms, or if your symptoms do not get better, return to the emergency room or call your doctor right away. A visit to the emergency room is only one step in your treatment. Even if you feel better, you still need to do what your doctor recommends, such as going to all suggested follow-up appointments and taking medicines exactly as directed. This will help you recover and help prevent future problems. How can you care for yourself at home? · Rest quietly for several hours after your nosebleed stops. · Avoid lifting or straining for 2 or 3 days after a nosebleed. · Do not use aspirin, ibuprofen (Advil, Motrin), or naproxen (Aleve) for 3 to 4 days after a nosebleed, unless your doctor tells you to. These medicines may make it harder to stop bleeding. You can use acetaminophen (Tylenol) for pain. · Do not use cold medicines or nasal sprays unless your doctor says it is okay. They can make your nose dry. · If you get another nosebleed:  ¨ Sit up and tilt your head slightly forward to keep blood from going down your throat. ¨ Use your thumb and index finger to pinch your nose shut for 10 minutes. Use a clock. Do not check to see if the bleeding has stopped before the 10 minutes are up. If the bleeding has not stopped, pinch your nose shut for another 10 minutes. ¨ When the bleeding has stopped, try not to blow your nose for 12 hours to keep it from bleeding again.   When should you call for help? Call 911 if:  · You passed out (lost consciousness). Return to the emergency room now if:  · You get another nosebleed and your nose is still bleeding after you have applied pressure 3 times for 10 minutes each time (30 minutes total). · Blood runs down the back of your throat even after you pinch your nose and tilt your head forward. Call your doctor today if:  · You take aspirin, warfarin (Coumadin), or other blood thinners, and you have had more than one nosebleed in a day. Where can you learn more? Go to [employer]  Enter U735 in the search box to learn more about \"Nosebleeds: After Your Visit to the Emergency Room. \"   © 9155-5294 [employer]. Care instructions adapted under license by [employer] (which disclaims liability or warranty for this information). This care instruction is for use with your licensed healthcare professional. If you have questions about a medical condition or this instruction, always ask your healthcare professional. [place] [address] any warranty or liability for your use of this information. Content Version: 9.4.59224;  Last Revised: November 18, 2010

## 2018-11-27 NOTE — ED NOTES
Patient does not appear to be in any acute distress/shows no evidence of clinical instability at this time. Provider has reviewed discharge instructions with the patient/family. The patient/family verbalized understanding instructions as well as need for follow up for any further symptoms. 
  
Discharge papers given, education provided, and any questions answered. Patient discharged by provider.

## 2018-11-27 NOTE — ED TRIAGE NOTES
\"I had a nosebleed today around 2pm and my blood pressure was 167/71 at the highest.\" Patient recently had changes made to BP meds.

## 2019-01-10 ENCOUNTER — OFFICE VISIT (OUTPATIENT)
Dept: CARDIOLOGY CLINIC | Age: 84
End: 2019-01-10

## 2019-01-10 DIAGNOSIS — Z95.0 CARDIAC PACEMAKER IN SITU: Primary | ICD-10-CM

## 2019-04-23 ENCOUNTER — OFFICE VISIT (OUTPATIENT)
Dept: CARDIOLOGY CLINIC | Age: 84
End: 2019-04-23

## 2019-04-23 DIAGNOSIS — Z95.0 CARDIAC PACEMAKER IN SITU: Primary | ICD-10-CM

## 2019-06-25 ENCOUNTER — HOSPITAL ENCOUNTER (INPATIENT)
Age: 84
LOS: 2 days | Discharge: HOME HEALTH | DRG: 202 | End: 2019-06-27
Attending: EMERGENCY MEDICINE | Admitting: INTERNAL MEDICINE
Payer: MEDICARE

## 2019-06-25 ENCOUNTER — APPOINTMENT (OUTPATIENT)
Dept: GENERAL RADIOLOGY | Age: 84
DRG: 202 | End: 2019-06-25
Attending: EMERGENCY MEDICINE
Payer: MEDICARE

## 2019-06-25 DIAGNOSIS — R79.89 ELEVATED BRAIN NATRIURETIC PEPTIDE (BNP) LEVEL: ICD-10-CM

## 2019-06-25 DIAGNOSIS — N17.9 AKI (ACUTE KIDNEY INJURY) (HCC): ICD-10-CM

## 2019-06-25 DIAGNOSIS — E87.1 HYPONATREMIA: Primary | ICD-10-CM

## 2019-06-25 PROBLEM — I95.9 HYPOTENSION: Status: ACTIVE | Noted: 2019-06-25

## 2019-06-25 PROBLEM — D72.829 LEUKOCYTOSIS: Status: ACTIVE | Noted: 2019-06-25

## 2019-06-25 PROBLEM — D64.9 ANEMIA: Status: ACTIVE | Noted: 2019-06-25

## 2019-06-25 PROBLEM — R05.9 COUGH: Status: ACTIVE | Noted: 2019-06-25

## 2019-06-25 PROBLEM — R53.1 WEAKNESS: Status: ACTIVE | Noted: 2019-06-25

## 2019-06-25 PROBLEM — E86.0 DEHYDRATION: Status: ACTIVE | Noted: 2019-06-25

## 2019-06-25 PROBLEM — Z45.018 ELECTIVE REPLACEMENT INDICATED FOR PACEMAKER: Status: RESOLVED | Noted: 2018-04-26 | Resolved: 2019-06-25

## 2019-06-25 LAB
ALBUMIN SERPL-MCNC: 3 G/DL (ref 3.5–5)
ALBUMIN/GLOB SERPL: 0.9 {RATIO} (ref 1.1–2.2)
ALP SERPL-CCNC: 157 U/L (ref 45–117)
ALT SERPL-CCNC: 41 U/L (ref 12–78)
ANION GAP SERPL CALC-SCNC: 7 MMOL/L (ref 5–15)
AST SERPL-CCNC: 37 U/L (ref 15–37)
BASOPHILS # BLD: 0 K/UL (ref 0–0.1)
BASOPHILS NFR BLD: 0 % (ref 0–1)
BILIRUB SERPL-MCNC: 1.2 MG/DL (ref 0.2–1)
BNP SERPL-MCNC: 3547 PG/ML
BUN SERPL-MCNC: 41 MG/DL (ref 6–20)
BUN/CREAT SERPL: 32 (ref 12–20)
CALCIUM SERPL-MCNC: 9.1 MG/DL (ref 8.5–10.1)
CHLORIDE SERPL-SCNC: 97 MMOL/L (ref 97–108)
CO2 SERPL-SCNC: 24 MMOL/L (ref 21–32)
COMMENT, HOLDF: NORMAL
CREAT SERPL-MCNC: 1.3 MG/DL (ref 0.55–1.02)
DIFFERENTIAL METHOD BLD: ABNORMAL
EOSINOPHIL # BLD: 0 K/UL (ref 0–0.4)
EOSINOPHIL NFR BLD: 0 % (ref 0–7)
ERYTHROCYTE [DISTWIDTH] IN BLOOD BY AUTOMATED COUNT: 13.8 % (ref 11.5–14.5)
GLOBULIN SER CALC-MCNC: 3.4 G/DL (ref 2–4)
GLUCOSE SERPL-MCNC: 173 MG/DL (ref 65–100)
HCT VFR BLD AUTO: 33.1 % (ref 35–47)
HGB BLD-MCNC: 11.2 G/DL (ref 11.5–16)
IMM GRANULOCYTES # BLD AUTO: 0 K/UL
IMM GRANULOCYTES NFR BLD AUTO: 0 %
LYMPHOCYTES # BLD: 0.9 K/UL (ref 0.8–3.5)
LYMPHOCYTES NFR BLD: 7 % (ref 12–49)
MCH RBC QN AUTO: 30.5 PG (ref 26–34)
MCHC RBC AUTO-ENTMCNC: 33.8 G/DL (ref 30–36.5)
MCV RBC AUTO: 90.2 FL (ref 80–99)
MONOCYTES # BLD: 1.1 K/UL (ref 0–1)
MONOCYTES NFR BLD: 9 % (ref 5–13)
NEUTS BAND NFR BLD MANUAL: 3 % (ref 0–6)
NEUTS SEG # BLD: 10.6 K/UL (ref 1.8–8)
NEUTS SEG NFR BLD: 81 % (ref 32–75)
NRBC # BLD: 0 K/UL (ref 0–0.01)
NRBC BLD-RTO: 0 PER 100 WBC
PLATELET # BLD AUTO: 227 K/UL (ref 150–400)
PMV BLD AUTO: 10.8 FL (ref 8.9–12.9)
POTASSIUM SERPL-SCNC: 4.3 MMOL/L (ref 3.5–5.1)
PROT SERPL-MCNC: 6.4 G/DL (ref 6.4–8.2)
RBC # BLD AUTO: 3.67 M/UL (ref 3.8–5.2)
RBC MORPH BLD: ABNORMAL
SAMPLES BEING HELD,HOLD: NORMAL
SODIUM SERPL-SCNC: 128 MMOL/L (ref 136–145)
TROPONIN I SERPL-MCNC: <0.05 NG/ML
WBC # BLD AUTO: 12.6 K/UL (ref 3.6–11)
WBC MORPH BLD: ABNORMAL

## 2019-06-25 PROCEDURE — 36415 COLL VENOUS BLD VENIPUNCTURE: CPT

## 2019-06-25 PROCEDURE — 82728 ASSAY OF FERRITIN: CPT

## 2019-06-25 PROCEDURE — 65270000029 HC RM PRIVATE

## 2019-06-25 PROCEDURE — 83010 ASSAY OF HAPTOGLOBIN QUANT: CPT

## 2019-06-25 PROCEDURE — 83540 ASSAY OF IRON: CPT

## 2019-06-25 PROCEDURE — 85025 COMPLETE CBC W/AUTO DIFF WBC: CPT

## 2019-06-25 PROCEDURE — 84443 ASSAY THYROID STIM HORMONE: CPT

## 2019-06-25 PROCEDURE — 82746 ASSAY OF FOLIC ACID SERUM: CPT

## 2019-06-25 PROCEDURE — 85379 FIBRIN DEGRADATION QUANT: CPT

## 2019-06-25 PROCEDURE — 82607 VITAMIN B-12: CPT

## 2019-06-25 PROCEDURE — 99284 EMERGENCY DEPT VISIT MOD MDM: CPT

## 2019-06-25 PROCEDURE — 93005 ELECTROCARDIOGRAM TRACING: CPT

## 2019-06-25 PROCEDURE — 83615 LACTATE (LD) (LDH) ENZYME: CPT

## 2019-06-25 PROCEDURE — 84484 ASSAY OF TROPONIN QUANT: CPT

## 2019-06-25 PROCEDURE — 80053 COMPREHEN METABOLIC PANEL: CPT

## 2019-06-25 PROCEDURE — 80061 LIPID PANEL: CPT

## 2019-06-25 PROCEDURE — 71046 X-RAY EXAM CHEST 2 VIEWS: CPT

## 2019-06-25 PROCEDURE — 83880 ASSAY OF NATRIURETIC PEPTIDE: CPT

## 2019-06-25 PROCEDURE — 85045 AUTOMATED RETICULOCYTE COUNT: CPT

## 2019-06-25 PROCEDURE — 87633 RESP VIRUS 12-25 TARGETS: CPT

## 2019-06-26 ENCOUNTER — APPOINTMENT (OUTPATIENT)
Dept: VASCULAR SURGERY | Age: 84
DRG: 202 | End: 2019-06-26
Attending: INTERNAL MEDICINE
Payer: MEDICARE

## 2019-06-26 ENCOUNTER — APPOINTMENT (OUTPATIENT)
Dept: GENERAL RADIOLOGY | Age: 84
DRG: 202 | End: 2019-06-26
Attending: INTERNAL MEDICINE
Payer: MEDICARE

## 2019-06-26 ENCOUNTER — APPOINTMENT (OUTPATIENT)
Dept: NON INVASIVE DIAGNOSTICS | Age: 84
DRG: 202 | End: 2019-06-26
Attending: INTERNAL MEDICINE
Payer: MEDICARE

## 2019-06-26 LAB
ALBUMIN SERPL-MCNC: 2.5 G/DL (ref 3.5–5)
ALBUMIN/GLOB SERPL: 0.8 {RATIO} (ref 1.1–2.2)
ALP SERPL-CCNC: 135 U/L (ref 45–117)
ALT SERPL-CCNC: 33 U/L (ref 12–78)
ANION GAP SERPL CALC-SCNC: 9 MMOL/L (ref 5–15)
AST SERPL-CCNC: 25 U/L (ref 15–37)
ATRIAL RATE: 81 BPM
B PERT DNA SPEC QL NAA+PROBE: NOT DETECTED
BILIRUB SERPL-MCNC: 1.3 MG/DL (ref 0.2–1)
BUN SERPL-MCNC: 37 MG/DL (ref 6–20)
BUN/CREAT SERPL: 36 (ref 12–20)
C PNEUM DNA SPEC QL NAA+PROBE: NOT DETECTED
CALCIUM SERPL-MCNC: 8.5 MG/DL (ref 8.5–10.1)
CALCULATED R AXIS, ECG10: -83 DEGREES
CALCULATED T AXIS, ECG11: 88 DEGREES
CHLORIDE SERPL-SCNC: 100 MMOL/L (ref 97–108)
CHOLEST SERPL-MCNC: 69 MG/DL
CO2 SERPL-SCNC: 21 MMOL/L (ref 21–32)
CREAT SERPL-MCNC: 1.02 MG/DL (ref 0.55–1.02)
D DIMER PPP FEU-MCNC: 0.39 MG/L FEU (ref 0–0.65)
DIAGNOSIS, 93000: NORMAL
ECHO AO ROOT DIAM: 3.02 CM
ECHO AV AREA PEAK VELOCITY: 2 CM2
ECHO AV PEAK GRADIENT: 6.3 MMHG
ECHO AV PEAK VELOCITY: 125.8 CM/S
ECHO EST RA PRESSURE: 15 MMHG
ECHO LA MAJOR AXIS: 3.99 CM
ECHO LA TO AORTIC ROOT RATIO: 1.32
ECHO LA VOL 2C: 52.42 ML (ref 22–52)
ECHO LA VOL 4C: 66.25 ML (ref 22–52)
ECHO LA VOL BP: 67.98 ML (ref 22–52)
ECHO LA VOL/BSA BIPLANE: 40.91 ML/M2 (ref 16–28)
ECHO LA VOLUME INDEX A2C: 31.54 ML/M2 (ref 16–28)
ECHO LA VOLUME INDEX A4C: 39.87 ML/M2 (ref 16–28)
ECHO LV INTERNAL DIMENSION DIASTOLIC: 4.91 CM (ref 3.9–5.3)
ECHO LV INTERNAL DIMENSION SYSTOLIC: 3.53 CM
ECHO LV IVSD: 0.87 CM (ref 0.6–0.9)
ECHO LV MASS 2D: 186.4 G (ref 67–162)
ECHO LV MASS INDEX 2D: 112.2 G/M2 (ref 43–95)
ECHO LV POSTERIOR WALL DIASTOLIC: 1 CM (ref 0.6–0.9)
ECHO LVOT DIAM: 1.92 CM
ECHO LVOT PEAK GRADIENT: 3.2 MMHG
ECHO LVOT PEAK VELOCITY: 88.83 CM/S
ECHO MV A VELOCITY: 24.67 CM/S
ECHO MV E DECELERATION TIME (DT): 198 MS
ECHO MV E VELOCITY: 122.23 CM/S
ECHO MV E/A RATIO: 4.95
ECHO MV REGURGITANT PEAK GRADIENT: 35.6 MMHG
ECHO MV REGURGITANT PEAK VELOCITY: 298.19 CM/S
ECHO PULMONARY ARTERY SYSTOLIC PRESSURE (PASP): 56.6 MMHG
ECHO PV MAX VELOCITY: 61.33 CM/S
ECHO PV PEAK GRADIENT: 1.5 MMHG
ECHO RIGHT VENTRICULAR SYSTOLIC PRESSURE (RVSP): 56.6 MMHG
ECHO RV INTERNAL DIMENSION: 3.35 CM
ECHO TV REGURGITANT MAX VELOCITY: 322.45 CM/S
ECHO TV REGURGITANT PEAK GRADIENT: 41.6 MMHG
ERYTHROCYTE [DISTWIDTH] IN BLOOD BY AUTOMATED COUNT: 13.9 % (ref 11.5–14.5)
FERRITIN SERPL-MCNC: 287 NG/ML (ref 26–388)
FLUAV H1 2009 PAND RNA SPEC QL NAA+PROBE: NOT DETECTED
FLUAV H1 RNA SPEC QL NAA+PROBE: NOT DETECTED
FLUAV H3 RNA SPEC QL NAA+PROBE: NOT DETECTED
FLUAV SUBTYP SPEC NAA+PROBE: NOT DETECTED
FLUBV RNA SPEC QL NAA+PROBE: NOT DETECTED
FOLATE SERPL-MCNC: 34.7 NG/ML (ref 5–21)
GLOBULIN SER CALC-MCNC: 3.1 G/DL (ref 2–4)
GLUCOSE BLD STRIP.AUTO-MCNC: 140 MG/DL (ref 65–100)
GLUCOSE BLD STRIP.AUTO-MCNC: 150 MG/DL (ref 65–100)
GLUCOSE BLD STRIP.AUTO-MCNC: 151 MG/DL (ref 65–100)
GLUCOSE BLD STRIP.AUTO-MCNC: 184 MG/DL (ref 65–100)
GLUCOSE SERPL-MCNC: 140 MG/DL (ref 65–100)
HADV DNA SPEC QL NAA+PROBE: NOT DETECTED
HAPTOGLOB SERPL-MCNC: 389 MG/DL (ref 30–200)
HCOV 229E RNA SPEC QL NAA+PROBE: NOT DETECTED
HCOV HKU1 RNA SPEC QL NAA+PROBE: NOT DETECTED
HCOV NL63 RNA SPEC QL NAA+PROBE: NOT DETECTED
HCOV OC43 RNA SPEC QL NAA+PROBE: NOT DETECTED
HCT VFR BLD AUTO: 30.2 % (ref 35–47)
HDLC SERPL-MCNC: 34 MG/DL
HDLC SERPL: 2 {RATIO} (ref 0–5)
HGB BLD-MCNC: 10.1 G/DL (ref 11.5–16)
HMPV RNA SPEC QL NAA+PROBE: NOT DETECTED
HPIV1 RNA SPEC QL NAA+PROBE: NOT DETECTED
HPIV2 RNA SPEC QL NAA+PROBE: NOT DETECTED
HPIV3 RNA SPEC QL NAA+PROBE: NOT DETECTED
HPIV4 RNA SPEC QL NAA+PROBE: NOT DETECTED
IRON SATN MFR SERPL: 9 % (ref 20–50)
IRON SERPL-MCNC: 16 UG/DL (ref 35–150)
LDH SERPL L TO P-CCNC: 172 U/L (ref 81–246)
LDLC SERPL CALC-MCNC: 24.6 MG/DL (ref 0–100)
LIPID PROFILE,FLP: NORMAL
M PNEUMO DNA SPEC QL NAA+PROBE: NOT DETECTED
MAGNESIUM SERPL-MCNC: 1.4 MG/DL (ref 1.6–2.4)
MCH RBC QN AUTO: 30.4 PG (ref 26–34)
MCHC RBC AUTO-ENTMCNC: 33.4 G/DL (ref 30–36.5)
MCV RBC AUTO: 91 FL (ref 80–99)
NRBC # BLD: 0 K/UL (ref 0–0.01)
NRBC BLD-RTO: 0 PER 100 WBC
PHOSPHATE SERPL-MCNC: 3.4 MG/DL (ref 2.6–4.7)
PLATELET # BLD AUTO: 199 K/UL (ref 150–400)
PMV BLD AUTO: 11.1 FL (ref 8.9–12.9)
POTASSIUM SERPL-SCNC: 4 MMOL/L (ref 3.5–5.1)
PROT SERPL-MCNC: 5.6 G/DL (ref 6.4–8.2)
Q-T INTERVAL, ECG07: 446 MS
QRS DURATION, ECG06: 140 MS
QTC CALCULATION (BEZET), ECG08: 481 MS
RBC # BLD AUTO: 3.32 M/UL (ref 3.8–5.2)
RETICS # AUTO: 0.03 M/UL (ref 0.02–0.08)
RETICS/RBC NFR AUTO: 1 % (ref 0.7–2.1)
RSV RNA SPEC QL NAA+PROBE: NOT DETECTED
RV+EV RNA SPEC QL NAA+PROBE: NOT DETECTED
SERVICE CMNT-IMP: ABNORMAL
SODIUM SERPL-SCNC: 130 MMOL/L (ref 136–145)
TIBC SERPL-MCNC: 184 UG/DL (ref 250–450)
TRIGL SERPL-MCNC: 52 MG/DL (ref ?–150)
TSH SERPL DL<=0.05 MIU/L-ACNC: 0.32 UIU/ML (ref 0.36–3.74)
VENTRICULAR RATE, ECG03: 70 BPM
VIT B12 SERPL-MCNC: >2000 PG/ML (ref 193–986)
VLDLC SERPL CALC-MCNC: 10.4 MG/DL
WBC # BLD AUTO: 12.1 K/UL (ref 3.6–11)

## 2019-06-26 PROCEDURE — 74011250636 HC RX REV CODE- 250/636: Performed by: INTERNAL MEDICINE

## 2019-06-26 PROCEDURE — 80053 COMPREHEN METABOLIC PANEL: CPT

## 2019-06-26 PROCEDURE — 65270000029 HC RM PRIVATE

## 2019-06-26 PROCEDURE — 82962 GLUCOSE BLOOD TEST: CPT

## 2019-06-26 PROCEDURE — 93970 EXTREMITY STUDY: CPT

## 2019-06-26 PROCEDURE — 85027 COMPLETE CBC AUTOMATED: CPT

## 2019-06-26 PROCEDURE — 93306 TTE W/DOPPLER COMPLETE: CPT

## 2019-06-26 PROCEDURE — 84100 ASSAY OF PHOSPHORUS: CPT

## 2019-06-26 PROCEDURE — 83735 ASSAY OF MAGNESIUM: CPT

## 2019-06-26 PROCEDURE — 97161 PT EVAL LOW COMPLEX 20 MIN: CPT

## 2019-06-26 PROCEDURE — 97165 OT EVAL LOW COMPLEX 30 MIN: CPT | Performed by: OCCUPATIONAL THERAPIST

## 2019-06-26 PROCEDURE — 97116 GAIT TRAINING THERAPY: CPT

## 2019-06-26 PROCEDURE — 74011250637 HC RX REV CODE- 250/637: Performed by: INTERNAL MEDICINE

## 2019-06-26 PROCEDURE — 97535 SELF CARE MNGMENT TRAINING: CPT | Performed by: OCCUPATIONAL THERAPIST

## 2019-06-26 PROCEDURE — 74011636637 HC RX REV CODE- 636/637: Performed by: INTERNAL MEDICINE

## 2019-06-26 PROCEDURE — 72100 X-RAY EXAM L-S SPINE 2/3 VWS: CPT

## 2019-06-26 RX ORDER — GUAIFENESIN 600 MG/1
600 TABLET, EXTENDED RELEASE ORAL EVERY 12 HOURS
Status: DISCONTINUED | OUTPATIENT
Start: 2019-06-26 | End: 2019-06-27 | Stop reason: HOSPADM

## 2019-06-26 RX ORDER — SODIUM CHLORIDE 9 MG/ML
100 INJECTION, SOLUTION INTRAVENOUS CONTINUOUS
Status: DISCONTINUED | OUTPATIENT
Start: 2019-06-26 | End: 2019-06-27 | Stop reason: HOSPADM

## 2019-06-26 RX ORDER — DEXTROSE MONOHYDRATE 25 G/50ML
25-50 INJECTION, SOLUTION INTRAVENOUS AS NEEDED
Status: DISCONTINUED | OUTPATIENT
Start: 2019-06-26 | End: 2019-06-27 | Stop reason: HOSPADM

## 2019-06-26 RX ORDER — HEPARIN SODIUM 5000 [USP'U]/ML
5000 INJECTION, SOLUTION INTRAVENOUS; SUBCUTANEOUS EVERY 8 HOURS
Status: DISCONTINUED | OUTPATIENT
Start: 2019-06-26 | End: 2019-06-26 | Stop reason: SDUPTHER

## 2019-06-26 RX ORDER — INSULIN LISPRO 100 [IU]/ML
INJECTION, SOLUTION INTRAVENOUS; SUBCUTANEOUS
Status: DISCONTINUED | OUTPATIENT
Start: 2019-06-26 | End: 2019-06-27 | Stop reason: HOSPADM

## 2019-06-26 RX ORDER — BENZONATATE 100 MG/1
CAPSULE ORAL
Status: ON HOLD | COMMUNITY
Start: 2019-06-19 | End: 2019-06-26

## 2019-06-26 RX ORDER — TRAMADOL HYDROCHLORIDE 50 MG/1
50 TABLET ORAL
Status: DISCONTINUED | OUTPATIENT
Start: 2019-06-26 | End: 2019-06-27 | Stop reason: HOSPADM

## 2019-06-26 RX ORDER — DICLOFENAC SODIUM 10 MG/G
2 GEL TOPICAL
COMMUNITY
Start: 2019-06-19 | End: 2021-07-26

## 2019-06-26 RX ORDER — LANOLIN ALCOHOL/MO/W.PET/CERES
325 CREAM (GRAM) TOPICAL
COMMUNITY

## 2019-06-26 RX ORDER — ATORVASTATIN CALCIUM 20 MG/1
40 TABLET, FILM COATED ORAL DAILY
Status: DISCONTINUED | OUTPATIENT
Start: 2019-06-26 | End: 2019-06-27 | Stop reason: HOSPADM

## 2019-06-26 RX ORDER — LEVOFLOXACIN 5 MG/ML
750 INJECTION, SOLUTION INTRAVENOUS
Status: DISCONTINUED | OUTPATIENT
Start: 2019-06-26 | End: 2019-06-27 | Stop reason: HOSPADM

## 2019-06-26 RX ORDER — DIPHENHYDRAMINE HCL 25 MG
25 CAPSULE ORAL
Status: DISCONTINUED | OUTPATIENT
Start: 2019-06-26 | End: 2019-06-27 | Stop reason: HOSPADM

## 2019-06-26 RX ORDER — IPRATROPIUM BROMIDE AND ALBUTEROL SULFATE 2.5; .5 MG/3ML; MG/3ML
3 SOLUTION RESPIRATORY (INHALATION)
Status: DISCONTINUED | OUTPATIENT
Start: 2019-06-26 | End: 2019-06-27 | Stop reason: HOSPADM

## 2019-06-26 RX ORDER — MAGNESIUM SULFATE 100 %
4 CRYSTALS MISCELLANEOUS AS NEEDED
Status: DISCONTINUED | OUTPATIENT
Start: 2019-06-26 | End: 2019-06-27 | Stop reason: HOSPADM

## 2019-06-26 RX ORDER — BENZONATATE 100 MG/1
100 CAPSULE ORAL
Status: DISCONTINUED | OUTPATIENT
Start: 2019-06-26 | End: 2019-06-27 | Stop reason: HOSPADM

## 2019-06-26 RX ORDER — ACETAMINOPHEN 325 MG/1
650 TABLET ORAL
Status: DISCONTINUED | OUTPATIENT
Start: 2019-06-26 | End: 2019-06-27 | Stop reason: HOSPADM

## 2019-06-26 RX ORDER — DABIGATRAN ETEXILATE 150 MG/1
150 CAPSULE ORAL 2 TIMES DAILY
Status: DISCONTINUED | OUTPATIENT
Start: 2019-06-26 | End: 2019-06-27 | Stop reason: HOSPADM

## 2019-06-26 RX ORDER — LABETALOL 200 MG/1
200 TABLET, FILM COATED ORAL 2 TIMES DAILY
Status: DISCONTINUED | OUTPATIENT
Start: 2019-06-26 | End: 2019-06-27 | Stop reason: HOSPADM

## 2019-06-26 RX ORDER — ONDANSETRON 2 MG/ML
4 INJECTION INTRAMUSCULAR; INTRAVENOUS
Status: DISCONTINUED | OUTPATIENT
Start: 2019-06-26 | End: 2019-06-27 | Stop reason: HOSPADM

## 2019-06-26 RX ADMIN — INSULIN LISPRO 2 UNITS: 100 INJECTION, SOLUTION INTRAVENOUS; SUBCUTANEOUS at 17:44

## 2019-06-26 RX ADMIN — LEVOFLOXACIN 750 MG: 5 INJECTION, SOLUTION INTRAVENOUS at 08:35

## 2019-06-26 RX ADMIN — DABIGATRAN ETEXILATE MESYLATE 150 MG: 150 CAPSULE ORAL at 20:40

## 2019-06-26 RX ADMIN — DABIGATRAN ETEXILATE MESYLATE 150 MG: 150 CAPSULE ORAL at 00:44

## 2019-06-26 RX ADMIN — ATORVASTATIN CALCIUM 40 MG: 20 TABLET, FILM COATED ORAL at 10:56

## 2019-06-26 RX ADMIN — SODIUM CHLORIDE 100 ML/HR: 900 INJECTION, SOLUTION INTRAVENOUS at 14:08

## 2019-06-26 RX ADMIN — GUAIFENESIN 600 MG: 600 TABLET, EXTENDED RELEASE ORAL at 20:40

## 2019-06-26 RX ADMIN — SODIUM CHLORIDE 1000 ML: 900 INJECTION, SOLUTION INTRAVENOUS at 00:07

## 2019-06-26 RX ADMIN — GUAIFENESIN 600 MG: 600 TABLET, EXTENDED RELEASE ORAL at 12:18

## 2019-06-26 RX ADMIN — SODIUM CHLORIDE 150 ML/HR: 900 INJECTION, SOLUTION INTRAVENOUS at 02:53

## 2019-06-26 NOTE — ED TRIAGE NOTES
Patient with c/o lower back pain. States that she had a cold last week and was given tessalon pearls for a cough by her pcp. States that this week, she hasnt been eating much and has been very fatigued and has not been able to take care of herself very easily.

## 2019-06-26 NOTE — PROGRESS NOTES
[facility]: MED RECONCILIATION    Comments/Recommendations:   Patient alert and oriented for medication reconciliation and overall fair historian, had detailed medication list to review. Patient states took all medications yesterday except second dose of dabigatran last night. Confirmed NKDA and preferred pharmacy as [facility]     Medications added:     none    Medications removed:    Benzonatate-states they do not work    Medications adjusted:    none    Information obtained from: patient, medication list, Rx query     Allergies: Patient has no known allergies. Prior to Admission Medications:     Medication Documentation Review Audit       Reviewed by Oliverio Patrick (Pharmacist) on 06/26/19 at 0720      Medication Sig Documenting Provider Last Dose Status Taking?   acarbose (PRECOSE) 50 mg tablet Take 50 mg by mouth two (2) times daily (with meals). Provider, Historical 6/25/2019 PM Active Yes   amLODIPine (NORVASC) 2.5 mg tablet Take 2.5 mg by mouth two (2) times a day. Lucas Auguste MD 6/25/2019 PM Active Yes   atorvastatin (LIPITOR) 40 mg tablet Take 40 mg by mouth daily. Provider, Historical 6/25/2019 AM Active Yes   cholecalciferol, vitamin D3, (VITAMIN D3) 2,000 unit tab Take 2,000 Units by mouth daily. Provider, Historical 6/25/2019 AM Active Yes   cyanocobalamin 1,000 mcg tablet Take 1,000 mcg by mouth daily. Provider, Historical 6/25/2019 AM Active Yes   dabigatran etexilate (PRADAXA) 150 mg capsule Take 150 mg by mouth two (2) times a day. Provider, Historical 6/25/2019 0900 Active Yes   diclofenac (VOLTAREN) 1 % gel Apply 2 g to affected area four (4) times daily as needed (applies to shoulder and back). Lucas Auguste MD 6/25/2019 Active Yes   DOCOSAHEXANOIC ACID/EPA (FISH OIL PO) Take 3,000 mg by mouth daily. Lucas Auguste MD 6/25/2019 AM Active Yes   ferrous sulfate 325 mg (65 mg iron) tablet Take 325 mg by mouth Daily (before breakfast).  Provider, Historical 6/25/2019 AM Active Yes hydroCHLOROthiazide (HYDRODIURIL) 50 mg tablet Take 50 mg by mouth daily. Provider, Historical 6/25/2019 AM Active Yes   labetalol (NORMODYNE) 200 mg tablet Take 200 mg by mouth two (2) times a day. Provider, Historical 6/25/2019 PM Active Yes   lisinopril (PRINIVIL, ZESTRIL) 5 mg tablet Take 5 mg by mouth daily. Provider, Historical 6/25/2019 AM Active Yes   metFORMIN (GLUCOPHAGE) 500 mg tablet Take 500 mg by mouth three (3) times daily (with meals). Other, MD Lucas 6/25/2019 PM Active Yes   vit C/E/Zn/coppr/lutein/zeaxan (PRESERVISION AREDS 2 PO) Take 1 Cap by mouth two (2) times a day.  Provider, Historical 6/25/2019 PM Active Yes                      Thank You,   Oliverio Shaffer, PharmD, BCPS   Contact: [phone]

## 2019-06-26 NOTE — PROGRESS NOTES
6/26/2019  3:23 PM    CM met with pt to discuss [facility] options, pt's son was present for discussion. They were both in agreement with [facility] after discharge, pt chose [address] and signed Choice form, form placed in pt's hard chart.   CM following for consult referral.  ERIC Hanson

## 2019-06-26 NOTE — PROGRESS NOTES
Orders received, chart reviewed and patient evaluated by physical therapy. Recommend patient to discharge to home with home health for safety eval pending progress with skilled acute care physical therapy. Full evaluation to follow.

## 2019-06-26 NOTE — PROGRESS NOTES
Bedside and Verbal shift change report given to Michael Walden RN (oncoming nurse) by Bernadette Trevino RN (offgoing nurse). Report included the following information SBAR, Kardex, Intake/Output and MAR.

## 2019-06-26 NOTE — H&P
[facility]    Hospitalist Admission Note      NAME:  Juan Hull   :   1935   MRN:  [id number]     PCP:  Sofia Bryan MD     Date/Time:  2019 11:40 PM          Subjective:     CHIEF COMPLAINT: weak, cough     HISTORY OF PRESENT ILLNESS:     Ms. Ravi Morin is a 81 y.o.  female who presented to the Emergency Department complaining of weakness and cough. She is a poor historian, but daughter and son are present to give hx. Cough going on for 7 days. No sick contacts. Poor PO intake. Weak and confused. Mild chronic LE edema. ER finds ARF with mild hyponatremia. We will admit her for management. Past Medical History:   Diagnosis Date    Atrial fibrillation ([address]     Cataract     Diabetes ([address]     Hypercholesterolemia     Hypertension     Pacemaker     Metronic generator changed on 5/3/18        Past Surgical History:   Procedure Laterality Date    COLONOSCOPY  2016         HX AFIB ABLATION      HX COLONOSCOPY      HX PACEMAKER      [employer]tr[employer] /gen change 5/3/18       Social History     Tobacco Use    Smoking status: Never Smoker    Smokeless tobacco: Never Used   Substance Use Topics    Alcohol use: No        No family history on file. Family hx cannot be fully assessed, due to her confusion    No Known Allergies     Prior to Admission medications    Medication Sig Start Date End Date Taking? Authorizing Provider   hydroCHLOROthiazide (HYDRODIURIL) 50 mg tablet Take 50 mg by mouth daily. Provider, Historical   labetalol (NORMODYNE) 200 mg tablet Take  by mouth two (2) times a day. Provider, Historical   lisinopril (PRINIVIL, ZESTRIL) 5 mg tablet Take  by mouth daily. Provider, Historical   atorvastatin (LIPITOR) 40 mg tablet Take  by mouth daily. Provider, Historical   cholecalciferol, vitamin D3, (VITAMIN D3) 2,000 unit tab Take  by mouth daily.     Provider, Historical   cyanocobalamin 1,000 mcg tablet Take 1,000 mcg by mouth daily. Provider, Historical   FERROUS SULFATE PO Take 28 mg by mouth daily. Provider, Historical   vit C/E/Zn/coppr/lutein/zeaxan (PRESERVISION AREDS 2 PO) Take  by mouth two (2) times a day. Provider, Historical   amLODIPine (NORVASC) 2.5 mg tablet Take 2.5 mg by mouth two (2) times a day. Other, MD Lucas   metFORMIN (GLUCOPHAGE) 500 mg tablet Take 500 mg by mouth three (3) times daily (with meals). Other, MD Lucas   DOCOSAHEXANOIC ACID/EPA (FISH OIL PO) Take 3,000 mg by mouth daily. Other, MD Lucas   DABIGATRAN ETEXILATE MESYLATE (PRADAXA PO) Take 150 mg by mouth two (2) times a day. Provider, Historical   acarbose (PRECOSE) 50 mg tablet Take 50 mg by mouth two (2) times daily (with meals).  4/15/11   Provider, Historical       Review of Systems:  (bold if positive, if negative)    Gen:  fatigueEyes:  ENT:  CVS:  Pulm:  Cough, dyspneaGI:  nauseaGU:  MS:  Skin:  Psych:  Endo:  Hem:  Renal:  Neuro:  weakness      Objective:      VITALS:    Vital signs reviewed; most recent are:    Visit Vitals  /56 (BP 1 Location: Right arm, BP Patient Position: Sitting)   Pulse 70   Temp 98.6 °F (37 °C)   Resp 20   Ht 5' 3\" (1.6 m)   Wt 63.5 kg (140 lb)   SpO2 97%   BMI 24.80 kg/m²     SpO2 Readings from Last 6 Encounters:   06/25/19 97%   11/27/18 98%   07/11/18 97%   06/08/18 97%   05/03/18 98%   04/25/18 96%        No intake or output data in the 24 hours ending 06/25/19 2340     Exam:     Physical Exam:    Gen:  Frail, in no acute distress  HEENT:  Pink conjunctivae, PERRL, hearing intact to voice, dry mucous membranes  Neck:  Supple, without masses, thyroid non-tender  Resp:  No accessory muscle use, clear breath sounds without wheezes rales or rhonchi  Card:  No murmurs, normal S1, S2 without thrills, bruits or peripheral edema  Abd:  Soft, non-tender, non-distended, normoactive bowel sounds are present, no mass  Lymph:  No cervical or inguinal adenopathy  Musc:  No cyanosis or clubbing  Skin: No rashes or ulcers, skin turgor is good  Neuro:  Cranial nerves are grossly intact, general motor weakness, follows commands   Psych:  Good insight, oriented to person, place and time, alert     Labs:    Recent Labs     06/25/19 2028   WBC 12.6*   HGB 11.2*   HCT 33.1*        Recent Labs     06/25/19 2028   *   K 4.3   CL 97   CO2 24   *   BUN 41*   CREA 1.30*   CA 9.1   ALB 3.0*   TBILI 1.2*   SGOT 37   ALT 41     Lab Results   Component Value Date/Time    Glucose (POC) 119 (H) 05/03/2018 12:58 PM    Glucose (POC) 123 (H) 05/03/2018 11:39 AM     No results for input(s): PH, PCO2, PO2, HCO3, FIO2 in the last 72 hours. No results for input(s): INR in the last 72 hours. No lab exists for component: INREXT  All Micro Results     Procedure Component Value Units Date/Time    RESPIRATORY Hailey Galvan [[id number]]     Order Status:  Sent Specimen:  NASOPHARYNGEAL SWAB     URINE CULTURE HOLD SAMPLE [[id number]]     Order Status:  Sent Specimen:  Urine              Assessment and Plan:      Hyponatremia / ARF (acute renal failure) / Dehydration - POA due to poor PO intake and meds. Hold diuretics and hydration. Monitor lytes. Orthostatic hypotension / HTN (hypertension), benign - Low due to ARF and dehydration. Stop HCTZ. Continue labetalol, hold lisinopril for now, perhaps stop. AF (atrial fibrillation) / Complete heart block / Pacemaker - Appears stable. Continue BB, Pradaxa    Diabetes type 2 without complications - Diabetic diet and counseling. SSI per protocol. Hold home metformin due to ARF. Check A1c. Weakness - POA due to ARF and URI. Fall precautions. Pt/OT eval.    Cough / Leukocytosis - POA, sub acute, no other SIRS criteria. Xray normal.  Presumed viral.  Check panel. No Abx. DDx includes CHF, but she reports unremarkable ECHO 3 months ago with DR Suzi Zeng (MV prolapse was only finding).   DDx also includes ACE, and she reports chronic cough for 1 year going back 2 years, but she does not known when she started ACE. DDx also includes GERD, as she notes her prior cough was always worse at night. Anemia - POA, mild but expected to worsen with hydration. Check panel. Usually on B12 and iron. Hypercholesterolemia - Continue atorvastatin and check panel. Lacks Dx of CAD or CVA to demand tight control. Edema - At this point no ECHO, I doubt CHF. If no viral URI, could consult cards or follow up as outpatient. Check protein. No diuretics. ADELA hose. STOP norvasc. Cataract - Supportive care      Telemetry reviewed:   normal sinus rhythm, paced    Risk of deterioration: high      Total time spent with patient: 79 Minutes I reviewed chart, notes, data and current medications in the medical record. I have examined and treated the patient at bedside during this period.                  Care Plan discussed with: Patient, Family, Nursing Staff and >50% of time spent in counseling and coordination of care    Discussed:  Care Plan       ___________________________________________________    Attending Physician: Doris Zapata MD

## 2019-06-26 NOTE — ED NOTES
Pt Throughput: Charge Nurse on 4th floor  made aware of patient's bed assignment, room 407. Amado Medina RN  Emergency Dept Charge RN.

## 2019-06-26 NOTE — ED PROVIDER NOTES
81 y.o. female with past medical history significant for DM, HTN, DDD, hypercholesterolemia, Afib and subsequent ablation, s/p pacemaker placement presents ambulatory and accompanied by daughter with chief complaint a persistent cough, decreased mobility, and increased back and shoulder pain in the last 2 weeks. Pt explains that she has been experiencing a cold for the last few days, and that has improved with the exception of the cough. She also reports associated bilateral green eye drainage and vision change and decreased appetite. Pt further indicates that her back and shoulder pain have increased in the last 2 weeks when compared to her baseline. Of note, the pt includes that she was seen by her PCP 1 week ago for her cold-like symptoms, where she was given benzonatate pills with minimal relief. LBM this morning. Pt includes that she is currently on Pradaxa. Pt denies any fevers, chills, urinary symptoms, or any other symptoms at this time. There are no other acute medical concerns at this time. Social hx: Patient denies Tobacco use. Denies EtOH use. Denies illicit drug abuse. PCP: Rajni Thomas MD    Past Medical History:  No date: Atrial fibrillation ([address]  No date: Cataract  No date: Diabetes ([address]  No date: Hypercholesterolemia  No date: Hypertension  No date: Pacemaker      Comment:  Metronic generator changed on 5/3/18  Past Surgical History:  4/14/2016: COLONOSCOPY      Comment:     No date: HX AFIB ABLATION  No date: HX COLONOSCOPY  2007: HX PACEMAKER      Comment:  Medtronic 2007/gen change 5/3/18      Note written by Sobeida Aldrich, as dictated by Mounika Robertson NP, 10:00 PM      The history is provided by the patient and a relative. No  was used.         Past Medical History:   Diagnosis Date    Atrial fibrillation ([address]     Cataract     Diabetes ([address]     Hypercholesterolemia     Hypertension     Pacemaker     Metronic generator changed on 5/3/18 Past Surgical History:   Procedure Laterality Date    COLONOSCOPY  4/14/2016         HX AFIB ABLATION      HX COLONOSCOPY      HX PACEMAKER  2007    Medtronic 2007/gen change 5/3/18         No family history on file. Social History     Socioeconomic History    Marital status: SINGLE     Spouse name: Not on file    Number of children: Not on file    Years of education: Not on file    Highest education level: Not on file   Occupational History    Not on file   Social Needs    Financial resource strain: Not on file    Food insecurity:     Worry: Not on file     Inability: Not on file    Transportation needs:     Medical: Not on file     Non-medical: Not on file   Tobacco Use    Smoking status: Never Smoker    Smokeless tobacco: Never Used   Substance and Sexual Activity    Alcohol use: No    Drug use: No    Sexual activity: Not on file   Lifestyle    Physical activity:     Days per week: Not on file     Minutes per session: Not on file    Stress: Not on file   Relationships    Social connections:     Talks on phone: Not on file     Gets together: Not on file     Attends Mosque service: Not on file     Active member of club or organization: Not on file     Attends meetings of clubs or organizations: Not on file     Relationship status: Not on file    Intimate partner violence:     Fear of current or ex partner: Not on file     Emotionally abused: Not on file     Physically abused: Not on file     Forced sexual activity: Not on file   Other Topics Concern    Not on file   Social History Narrative    Not on file         ALLERGIES: Patient has no known allergies. Review of Systems   Constitutional: Positive for appetite change (decreased). Negative for chills, diaphoresis, fatigue and fever. HENT: Positive for congestion. Negative for ear discharge, ear pain, sinus pressure, sinus pain, sore throat and trouble swallowing.     Eyes: Positive for discharge (bilateral) and visual disturbance (bilateral). Negative for photophobia, pain and redness. Respiratory: Positive for cough. Negative for chest tightness, shortness of breath and wheezing. Cardiovascular: Positive for leg swelling (ankles). Negative for chest pain and palpitations. Gastrointestinal: Negative for abdominal distention, abdominal pain, nausea and vomiting. Endocrine: Negative. Genitourinary: Negative for difficulty urinating, dysuria, flank pain, frequency and urgency. Musculoskeletal: Positive for arthralgias (bilateral shoulders) and back pain. Negative for neck pain and neck stiffness. Skin: Negative for color change, pallor, rash and wound. Allergic/Immunologic: Negative. Neurological: Positive for weakness. Negative for dizziness, speech difficulty and headaches. Hematological: Does not bruise/bleed easily. Psychiatric/Behavioral: Negative for behavioral problems. The patient is not nervous/anxious. All other systems reviewed and are negative. Vitals:    06/25/19 2015   BP: 111/56   Pulse: 70   Resp: 20   Temp: 98.6 °F (37 °C)   SpO2: 97%   Weight: 63.5 kg (140 lb)   Height: 5' 3\" (1.6 m)            Physical Exam   Constitutional: She is oriented to person, place, and time. She appears well-developed and well-nourished. No distress. HENT:   Head: Normocephalic and atraumatic. Right Ear: External ear normal.   Left Ear: External ear normal.   Nose: Nose normal.   Mouth/Throat: Oropharynx is clear and moist.   Eyes: Pupils are equal, round, and reactive to light. Conjunctivae and EOM are normal. Right eye exhibits no discharge. Left eye exhibits no discharge. Neck: Normal range of motion. Neck supple. No JVD present. No tracheal deviation present. Cardiovascular: Normal rate, normal heart sounds and intact distal pulses. Exam reveals no gallop. No murmur heard. Pt has bilateral ankle edema. VPaced on telemetry   Pulmonary/Chest: Effort normal. No respiratory distress.  She has no wheezes. She has rales in the right lower field and the left lower field. She exhibits no tenderness. Abdominal: Soft. Bowel sounds are normal. She exhibits no distension. There is no tenderness. There is no rebound and no guarding. Genitourinary:   Genitourinary Comments: Negative     Musculoskeletal: Normal range of motion. She exhibits no edema or tenderness. Neurological: She is alert and oriented to person, place, and time. Skin: Skin is warm and dry. Capillary refill takes less than 2 seconds. No rash noted. No erythema. No pallor. Psychiatric: She has a normal mood and affect. Her behavior is normal. Judgment and thought content normal.   Nursing note and vitals reviewed. Note written by Noam Tidwell, as dictated by Tk Rey NP, 10:00 PM     MDM  Number of Diagnoses or Management Options  NOHELIA (acute kidney injury) [facility]): new and requires workup  Elevated brain natriuretic peptide (BNP) level: new and requires workup  Hyponatremia: new and requires workup  Diagnosis management comments: DDx:  MI, CHF, Pneumonia, viral syndrome    Plan:  Admit to the hospitalist service for further evaluation and treatment. Patient in agreement with plan of care. Amount and/or Complexity of Data Reviewed  Clinical lab tests: ordered and reviewed  Tests in the radiology section of CPT®: ordered and reviewed  Discuss the patient with other providers: yes (Discussed with Dr. Susan Buckley  )           Procedures  ED EKG interpretation:  Rhythm: Ventricular paced rhythm; and regular . Rate (approx.): 70; Axis: normal; ST/T wave: normal.  Note written by Ann Pimentel, as dictated by Mary Thomas NP 11:00 PM    Hospitalist Samuel for Admission  11:22 PM    ED Room Number: ER03/03  Patient Name and age: Dima Moses 81 y.o.  female  Working Diagnosis:   1. Hyponatremia    2.  NOHELIA (acute kidney injury) ([facility] [address]    3. Elevated brain natriuretic peptide (BNP) level      Readmission: no  Isolation Requirements:  no  Recommended Level of Care:  telemetry  Code Status:  full  Other:  Cough, increased lethargy, mild ankle swelling

## 2019-06-26 NOTE — PROGRESS NOTES
6/26/2019  11:50 AM    Reason for Admission:   Hyponatremia                   RRAT Score:  10           Plan for utilizing home health:     TBD - HH vs. rehab                    Current Advanced Directive/Advance Care Plan: Not on file                         Transition of Care Plan:          Pt resides alone in one level home in [facility] with 5 exterior stairs. The pt has a daughter and a son who reside locally, the daughter lives 15 minutes away and visits 2-3 times per week. The pt has her RX filled at [facility] on [address]. The pt does hot have a hx of HH, Rehab, or SNF. No DME at home (besides bars placed in shower). Pt's daughter will provide discharge transportation. The pt was independent with ADL's before admission, pt was driving locally and drove herself to her weekly hair appointments. OT has recommended rehab, PT recommended HH. The pt advised she was scheduled to begin outpatient PT on 6/28/19 for her back pain. CM will continue to follow for discharge needs. Marcie Rinne, MSW    Care Management Interventions  PCP Verified by CM: Yes  Last Visit to PCP: 06/19/19  Transition of Care Consult (CM Consult): Discharge Planning  Physical Therapy Consult: Yes  Occupational Therapy Consult: Yes  Current Support Network: Lives Alone  Confirm Follow Up Transport: Family  Plan discussed with Pt/Family/Caregiver:  Yes

## 2019-06-26 NOTE — PROGRESS NOTES
Franklyn Chauhan [facility] 79  [address] [facility], [place], [address] [place] [address]  [phone]      Medical Progress Note      NAME: North Grier   :  1935  MRM:  [id number]    Date/Time: 2019         Subjective:     Chief Complaint:  Patient was seen and examined by me. Chart reviewed. Still with coughing, weakness. No fevers, chills       Objective:       Vitals:       Last 24hrs VS reviewed since prior progress note.  Most recent are:    Visit Vitals  BP (P) 105/58 (BP 1 Location: Left arm, BP Patient Position: During activity;Supine)   Pulse (P) 70   Temp 99.1 °F (37.3 °C)   Resp 18   Ht 5' 3\" (1.6 m)   Wt 63.5 kg (140 lb)   SpO2 94%   BMI 24.80 kg/m²     SpO2 Readings from Last 6 Encounters:   19 94%   18 98%   18 97%   18 97%   18 98%   18 96%        No intake or output data in the 24 hours ending 19 1248     Exam:     Physical Exam:    Gen:  Elderly, frail, NAD  HEENT:  Pink conjunctivae, PERRL, hearing intact to voice, moist mucous membranes  Neck:  Supple, without masses, thyroid non-tender  Resp:  No accessory muscle use, clear breath sounds without wheezes rales or rhonchi  Card:  No murmurs, normal S1, S2 without thrills, mild edema  Abd:  Soft, non-tender, non-distended, normoactive bowel sounds are present  Musc:  No cyanosis or clubbing  Skin:  No rashes   Neuro:  Cranial nerves 3-12 are grossly intact, follows commands appropriately  Psych:  Good insight, oriented to person, place and time, alert    Medications Reviewed: (see below)    Lab Data Reviewed: (see below)    ______________________________________________________________________    Medications:     Current Facility-Administered Medications   Medication Dose Route Frequency    labetalol (NORMODYNE) tablet 200 mg  200 mg Oral BID    dabigatran etexilate (PRADAXA) capsule 150 mg  150 mg Oral BID    atorvastatin (LIPITOR) tablet 40 mg  40 mg Oral DAILY    glucose chewable tablet 16 g  4 Tab Oral PRN    dextrose (D50) infusion 12.5-25 g  25-50 mL IntraVENous PRN    glucagon (GLUCAGEN) injection 1 mg  1 mg IntraMUSCular PRN    0.9% sodium chloride infusion  100 mL/hr IntraVENous CONTINUOUS    acetaminophen (TYLENOL) tablet 650 mg  650 mg Oral Q4H PRN    diphenhydrAMINE (BENADRYL) capsule 25 mg  25 mg Oral Q4H PRN    ondansetron (ZOFRAN) injection 4 mg  4 mg IntraVENous Q4H PRN    insulin lispro (HUMALOG) injection   SubCUTAneous AC&HS    albuterol-ipratropium (DUO-NEB) 2.5 MG-0.5 MG/3 ML  3 mL Nebulization Q4H PRN    levoFLOXacin (LEVAQUIN) 750 mg in D5W IVPB  750 mg IntraVENous Q48H    benzonatate (TESSALON) capsule 100 mg  100 mg Oral TID PRN    guaiFENesin ER (MUCINEX) tablet 600 mg  600 mg Oral Q12H    traMADol (ULTRAM) tablet 50 mg  50 mg Oral Q6H PRN          Lab Review:     Recent Labs     06/26/19  0642 06/25/19 2028   WBC 12.1* 12.6*   HGB 10.1* 11.2*   HCT 30.2* 33.1*    227     Recent Labs     06/26/19  0642 06/25/19 2028   * 128*   K 4.0 4.3    97   CO2 21 24   * 173*   BUN 37* 41*   CREA 1.02 1.30*   CA 8.5 9.1   MG 1.4*  --    PHOS 3.4  --    ALB 2.5* 3.0*   TBILI 1.3* 1.2*   SGOT 25 37   ALT 33 41     Lab Results   Component Value Date/Time    Glucose (POC) 151 (H) 06/26/2019 12:05 PM    Glucose (POC) 140 (H) 06/26/2019 07:31 AM    Glucose (POC) 119 (H) 05/03/2018 12:58 PM    Glucose (POC) 123 (H) 05/03/2018 11:39 AM    Glucose (POC) 92 04/14/2016 10:38 AM          Assessment / Plan: Active Problems:    80 yo hx of HTN, DM, afib s/p ablation, heart block s/p pacer, presented w/ weakness, URI symptoms, elevated pro BNP, hyponatremia, NOHELIA    1) Acute bronchitis: viral vs bacterial.  Viral panel neg. Will empirically start IV levofloxacin. Cont O2 prn, nebs prn, incentive spirometry. Can switch to PO on discharge    2) Elevated pro-BNP: no evidence of heart failure.   Echo with EF 55-60%    3) NOHELIA: due to pre-renal.  Improving with IVF    4) Hyponatremia: due to dehydration. Improving. Will hold HCTZ. Monitor     5) HTN/Afib: s/p ablation, pacer. Holding norvasc, lisinopril. Cont BB, pradaxa, statin    6) Edema: mild. norvasc on hold. LE dopplers neg for DVT. Might need some diuretics once renal function improves    7) Lower back pain: acute on chronic. Will obtain Lumbar xray. Start tramadol prn    8) Weakness/Debility: cont PT/OT.   Might need rehab vs HH (more likely)    Total time spent with patient: 35 min                  Care Plan discussed with: Patient, daughter, nursing    Discussed:  Care Plan    Prophylaxis:  pradaxa    Disposition:  HH PT, OT, RN           ___________________________________________________    Attending Physician: Jayro Sadler MD

## 2019-06-26 NOTE — DIABETES MGMT
[facility]    [facility] Progress Note    Recommendations/ Comments: Noted consult for assessment of home management. Patient currently off the floor for x-ray. Noted patient has been refusing insulin today. POC glucose < 180 mg/dL as of chart note. Chart reviewed on Juan Hull. Patient is a 81 y.o. female with known Type 2 Diabetes on oral agents (dual therapy): metformin (generic) 500 mg TID, acarbose (Precose) 50 mg BID at home. A1c:   No results found for: HBA1C, HGBE8, JBV4LLCG    Recent Glucose Results:   Lab Results   Component Value Date/Time     (H) 06/26/2019 06:42 AM     (H) 06/25/2019 08:28 PM    GLUCPOC 151 (H) 06/26/2019 12:05 PM    GLUCPOC 140 (H) 06/26/2019 07:31 AM        Lab Results   Component Value Date/Time    Creatinine 1.02 06/26/2019 06:42 AM     Estimated Creatinine Clearance: 37.5 mL/min (based on SCr of 1.02 mg/dL). Active Orders   Diet    DIET CARDIAC Regular; Consistent Carb 1800kcal        PO intake: No data found. Will continue to follow as needed.     Thank you  Oziel Sanders, RN  Office [phone]  Pager [phone]          Time spent: 2 min

## 2019-06-26 NOTE — PROGRESS NOTES
Problem: Self Care Deficits Care Plan (Adult)  Goal: *Acute Goals and Plan of Care (Insert Text)  Description  Occupational Therapy Goals  Initiated 6/26/2019  1. Patient will perform grooming standing at sink with supervision/set-up within 7 day(s). 2.  Patient will perform upper body dressing with supervision/set-up within 7 day(s). 3.  Patient will perform lower body dressing with supervision/set-up within 7 day(s). 4.  Patient will perform toilet transfers with supervision/set-up within 7 day(s). 5.  Patient will perform all aspects of toileting with supervision/set-up within 7 day(s). 6.  Patient will participate in upper extremity therapeutic exercise/activities with supervision/set-up for 5 minutes within 7 day(s). 7.  Patient will utilize energy conservation techniques during functional activities with verbal cues within 7 day(s). Outcome: Progressing Towards Goal    OCCUPATIONAL THERAPY EVALUATION  Patient: [place] Grier (35 y.o. female)  Date: 6/26/2019  Primary Diagnosis: Hyponatremia [E87.1]       Precautions: Fall    ASSESSMENT :  Based on the objective data described below, the patient presents below independent functional baseline with generalized weakness, decreased strength, endurance, balance, and fear of falling following admission for hyponatremia. She currently requires min A level for transfers, functional mobility, and LB ADLs. PTA pt was independent with ADLs and IADLs, including driving. She lives alone. In the past couple of weeks she has been feeling weaker and experienced 2 falls. Patient will benefit from skilled intervention to address the above impairments. Will continue to follow to address safety and balance with ADLs and functional mobility. Patients rehabilitation potential is considered to be Good  Factors which may influence rehabilitation potential include:   ? None noted  ? Mental ability/status  ? Medical condition  ? Home/family situation and support systems  ? Safety awareness  ? Pain tolerance/management  ? Other:      PLAN :  Recommendations and Planned Interventions:  ?               Self Care Training                  ? Therapeutic Activities  ? Functional Mobility Training    ? Cognitive Retraining  ? Therapeutic Exercises           ? Endurance Activities  ? Balance Training                   ? Neuromuscular Re-Education  ? Visual/Perceptual Training     ? Home Safety Training  ? Patient Education                 ? Family Training/Education  ? Other (comment):    Frequency/Duration: Patient will be followed by occupational therapy 5 times a week to address goals. Discharge Recommendations: Rehab vs HH therapy with assist for safety depending on progress and available assist at home  Further Equipment Recommendations for Discharge: TBD     SUBJECTIVE:   Patient stated I feel better.     OBJECTIVE DATA SUMMARY:   HISTORY:   Past Medical History:   Diagnosis Date    [facility] ([facility]     Cataract     Diabetes ([facility]     Hypercholesterolemia     Hypertension     Pacemaker     Metronic generator changed on 5/3/18     Past Surgical History:   Procedure Laterality Date    COLONOSCOPY  4/14/2016         HX AFIB ABLATION      HX COLONOSCOPY      HX PACEMAKER  2007    Medtronic 2007/gen change 5/3/18       Prior Level of Function/Environment/Context: Independent in ADLs and IADLs, drives. In the past few weeks has been feeling weaker, needing assistance from her daughter, and has experienced 2 falls.     Home Situation  Home Environment: Private residence  # Steps to Enter: (5)  Rails to Enter: Yes  Hand Rails : Left  One/Two Story Residence: One story  Living Alone: Yes  Support Systems: Family member(s)  Patient Expects to be Discharged to[de-identified] Private residence  Current DME Used/Available at Home: Grab bars  Tub or Shower Type: Shower    EXAMINATION OF PERFORMANCE DEFICITS:  Cognitive/Behavioral Status:  Neurologic State: Alert  Orientation Level: Oriented X4  Cognition: Follows commands  Perception: Appears intact  Perseveration: Perseverates during conversation(wanting to know about BP medication)  Safety/Judgement: Fall prevention;Home safety; Insight into deficits; Awareness of environment      Hearing: Auditory  Auditory Impairment: None    Vision/Perceptual:       Acuity: Within Defined Limits    Corrective Lenses: Glasses    Range of Motion:  AROM: Within functional limits  PROM: Within functional limits      Strength:  Strength: Generally decreased, functional      Coordination:  Coordination: Generally decreased, functional  Fine Motor Skills-Upper: Left Intact; Right Intact    Gross Motor Skills-Upper: Left Intact; Right Intact    Tone & Sensation:  Tone: Normal  Sensation: Intact      Balance:  Sitting: Intact  Standing: Impaired; With support    Functional Mobility and Transfers for ADLs:  Bed Mobility:  Rolling: Stand-by assistance  Supine to Sit: Stand-by assistance  Sit to Supine: Contact guard assistance  Scooting: Contact guard assistance    Transfers:  Sit to Stand: Minimum assistance(for safety, impaired balance)  Stand to Sit: Minimum assistance(for safety, impaired balance)  Bathroom Mobility: Minimum assistance(for safety, impaired balance)  Toilet Transfer : Minimum assistance(for safety, impaired balance)    ADL Assessment:  Feeding: Independent    Oral Facial Hygiene/Grooming: Minimum assistance(standing at sink)    Bathing: Minimum assistance(for safety with LEs)    Upper Body Dressing: Stand-by assistance    Lower Body Dressing: Minimum assistance(for safety, impaired balance)    Toileting: Minimum assistance(for safety, impaired balance)    ADL Intervention and task modifications:    Cognitive Retraining  Safety/Judgement: Fall prevention;Home safety; Insight into deficits; Awareness of environment      Functional Measure:  Barthel Index:    Bathin  Bladder: 10  Bowels: 10  Groomin  Dressin  Feeding: 10  Mobility: 10  Stairs: 0  Toilet Use: 5  Transfer (Bed to Chair and Back): 10  Total: 65/100        Percentage of impairment   0%   1-19%   20-39%   40-59%   60-79%   80-99%   100%   Barthel Score 0-100 100 99-80 79-60 59-40 20-39 1-19   0     The Barthel ADL Index: Guidelines  1. The index should be used as a record of what a patient does, not as a record of what a patient could do. 2. The main aim is to establish degree of independence from any help, physical or verbal, however minor and for whatever reason. 3. The need for supervision renders the patient not independent. 4. A patient's performance should be established using the best available evidence. Asking the patient, friends/relatives and nurses are the usual sources, but direct observation and common sense are also important. However direct testing is not needed. 5. Usually the patient's performance over the preceding 24-48 hours is important, but occasionally longer periods will be relevant. 6. Middle categories imply that the patient supplies over 50 per cent of the effort. 7. Use of aids to be independent is allowed. Nonie Freshwater., Barthel, D.W. (7429). Functional evaluation: the Barthel Index. [facility] (14)2. BRISEIDA Kong, Elizabeth Ha., [facility], [address] [place] [address] (). Measuring the change indisability after inpatient rehabilitation; comparison of the responsiveness of the Barthel Index and Functional [place] Measure. Journal of Neurology, Neurosurgery, and Psychiatry, 66(4), 586-790. Jill Abbasi, N.[email or web address].A, PROSPER Kimball, & Dominique Roldan MMadihaA. (2004.) Assessment of post-stroke quality of life in cost-effectiveness studies: The usefulness of the Barthel Index and the EuroQoL-5D.  [facility], 13, 164-12 Occupational Therapy Evaluation Charge Determination   History Examination Decision-Making   LOW Complexity : Brief history review  MEDIUM Complexity : 3-5 performance deficits relating to physical, cognitive , or psychosocial skils that result in activity limitations and / or participation restrictions LOW Complexity : No comorbidities that affect functional and no verbal or physical assistance needed to complete eval tasks       Based on the above components, the patient evaluation is determined to be of the following complexity level: LOW   Pain:  Pain Scale 1: Numeric (0 - 10)  Pain Intensity 1: 0              Activity Tolerance:   Good  Please refer to the flowsheet for vital signs taken during this treatment. After treatment: Pt taken to imaging  ? Patient left in no apparent distress sitting up in chair  ? Patient left in no apparent distress in bed  ? Call bell left within reach  ? Nursing notified  ? Caregiver present  ? Bed alarm activated    COMMUNICATION/EDUCATION:   The patients plan of care was discussed with: Registered Nurse. XHome safety education was provided and the patient/caregiver indicated understanding. XPatient/family have participated as able in goal setting and plan of care. XPatient/family agree to work toward stated goals and plan of care. ? Patient understands intent and goals of therapy, but is neutral about his/her participation. ? Patient is unable to participate in goal setting and plan of care. This patients plan of care is appropriate for delegation to \[facility]\"". Regarding student involvement in patient care:  A student participated in this treatment session. Per CMS Medicare statements and AOTA guidelines I certify that the following was true:  1. I was present and directly observed the entire session. 2. I made all skilled judgments and clinical decisions regarding care. 3. I am the practitioner responsible for assessment, treatment, and documentation.      Thank you for this referral.  [place] Postal  Time Calculation: 30 mins

## 2019-06-26 NOTE — ED NOTES
TRANSFER - OUT REPORT:    Verbal report given to Sharon Bhatt RN(name) on Justyna Aguirre  being transferred to [facility](unit) for routine progression of care       Report consisted of patients Situation, Background, Assessment and   Recommendations(SBAR). Information from the following report(s) SBAR, Kardex, [facility] and Recent Results was reviewed with the receiving nurse. Lines:   Peripheral IV 06/25/19 Left Antecubital (Active)   Site Assessment Clean, dry, & intact 6/25/2019  8:28 PM   Phlebitis Assessment 1 6/25/2019  8:28 PM   Infiltration Assessment 0 6/25/2019  8:28 PM   Dressing Status Clean, dry, & intact 6/25/2019  8:28 PM   Dressing Type Transparent;Tape 6/25/2019  8:28 PM   Hub Color/Line Status Pink;Flushed 6/25/2019  8:28 PM   Action Taken Blood drawn 6/25/2019  8:28 PM        Opportunity for questions and clarification was provided.       Patient transported with:   [place]

## 2019-06-26 NOTE — PROGRESS NOTES
[address] [place] [address] Pharmacy Dosing Services: Antimicrobial Stewardship Progress Note    Levofloxacin: dose adjustment per P&T protocol  Pharmacist reviewed antibiotic appropriateness for 81year old , female  for indication of CAP  Day of Therapy: 1 of 5    Non-Kinetic Antimicrobial Dosing:   Assessment/Plan: CrCl 37.5 mL/min. Levofloxacin changed to 750 mg IV every 48 hours x 5 days per P&T protocol for patient with CrCl 20-49 mL/min. Serum Creatinine     Lab Results   Component Value Date/Time    Creatinine 1.02 06/26/2019 06:42 AM         Creatinine Clearance Estimated Creatinine Clearance: 37.5 mL/min (based on SCr of 1.02 mg/dL).      Temp   99.1 °F (37.3 °C)    WBC   Lab Results   Component Value Date/Time    WBC 12.1 (H) 06/26/2019 06:42 AM         H/H   Lab Results   Component Value Date/Time    HGB 10.1 (L) 06/26/2019 06:42 AM          Platelets   Lab Results   Component Value Date/Time    PLATELET 523 15/54/8023 06:42 AM        Pharmacist: Dayna Barros

## 2019-06-27 VITALS
DIASTOLIC BLOOD PRESSURE: 60 MMHG | HEART RATE: 70 BPM | BODY MASS INDEX: 25.71 KG/M2 | RESPIRATION RATE: 16 BRPM | HEIGHT: 63 IN | TEMPERATURE: 97.5 F | SYSTOLIC BLOOD PRESSURE: 127 MMHG | OXYGEN SATURATION: 96 % | WEIGHT: 145.1 LBS

## 2019-06-27 LAB
ANION GAP SERPL CALC-SCNC: 8 MMOL/L (ref 5–15)
BUN SERPL-MCNC: 21 MG/DL (ref 6–20)
BUN/CREAT SERPL: 27 (ref 12–20)
CALCIUM SERPL-MCNC: 8.4 MG/DL (ref 8.5–10.1)
CHLORIDE SERPL-SCNC: 105 MMOL/L (ref 97–108)
CO2 SERPL-SCNC: 22 MMOL/L (ref 21–32)
CREAT SERPL-MCNC: 0.78 MG/DL (ref 0.55–1.02)
ERYTHROCYTE [DISTWIDTH] IN BLOOD BY AUTOMATED COUNT: 14 % (ref 11.5–14.5)
EST. AVERAGE GLUCOSE BLD GHB EST-MCNC: 134 MG/DL
GLUCOSE BLD STRIP.AUTO-MCNC: 153 MG/DL (ref 65–100)
GLUCOSE BLD STRIP.AUTO-MCNC: 161 MG/DL (ref 65–100)
GLUCOSE BLD STRIP.AUTO-MCNC: 188 MG/DL (ref 65–100)
GLUCOSE SERPL-MCNC: 143 MG/DL (ref 65–100)
HBA1C MFR BLD: 6.3 % (ref 4.2–6.3)
HCT VFR BLD AUTO: 30 % (ref 35–47)
HGB BLD-MCNC: 10.2 G/DL (ref 11.5–16)
MAGNESIUM SERPL-MCNC: 1.3 MG/DL (ref 1.6–2.4)
MCH RBC QN AUTO: 30.9 PG (ref 26–34)
MCHC RBC AUTO-ENTMCNC: 34 G/DL (ref 30–36.5)
MCV RBC AUTO: 90.9 FL (ref 80–99)
NRBC # BLD: 0 K/UL (ref 0–0.01)
NRBC BLD-RTO: 0 PER 100 WBC
PHOSPHATE SERPL-MCNC: 2.9 MG/DL (ref 2.6–4.7)
PLATELET # BLD AUTO: 204 K/UL (ref 150–400)
PMV BLD AUTO: 10.3 FL (ref 8.9–12.9)
POTASSIUM SERPL-SCNC: 3.7 MMOL/L (ref 3.5–5.1)
RBC # BLD AUTO: 3.3 M/UL (ref 3.8–5.2)
SERVICE CMNT-IMP: ABNORMAL
SODIUM SERPL-SCNC: 135 MMOL/L (ref 136–145)
WBC # BLD AUTO: 11.6 K/UL (ref 3.6–11)

## 2019-06-27 PROCEDURE — 36415 COLL VENOUS BLD VENIPUNCTURE: CPT

## 2019-06-27 PROCEDURE — 94761 N-INVAS EAR/PLS OXIMETRY MLT: CPT

## 2019-06-27 PROCEDURE — 74011250637 HC RX REV CODE- 250/637: Performed by: INTERNAL MEDICINE

## 2019-06-27 PROCEDURE — 80048 BASIC METABOLIC PNL TOTAL CA: CPT

## 2019-06-27 PROCEDURE — 85027 COMPLETE CBC AUTOMATED: CPT

## 2019-06-27 PROCEDURE — 97530 THERAPEUTIC ACTIVITIES: CPT

## 2019-06-27 PROCEDURE — 84100 ASSAY OF PHOSPHORUS: CPT

## 2019-06-27 PROCEDURE — 97116 GAIT TRAINING THERAPY: CPT

## 2019-06-27 PROCEDURE — 82962 GLUCOSE BLOOD TEST: CPT

## 2019-06-27 PROCEDURE — 74011636637 HC RX REV CODE- 636/637: Performed by: INTERNAL MEDICINE

## 2019-06-27 PROCEDURE — 97535 SELF CARE MNGMENT TRAINING: CPT

## 2019-06-27 PROCEDURE — 83036 HEMOGLOBIN GLYCOSYLATED A1C: CPT

## 2019-06-27 PROCEDURE — 83735 ASSAY OF MAGNESIUM: CPT

## 2019-06-27 PROCEDURE — 94760 N-INVAS EAR/PLS OXIMETRY 1: CPT

## 2019-06-27 RX ORDER — BENZONATATE 100 MG/1
100 CAPSULE ORAL
Qty: 15 CAP | Refills: 0 | Status: SHIPPED | OUTPATIENT
Start: 2019-06-27 | End: 2019-07-04

## 2019-06-27 RX ORDER — DOXYCYCLINE 100 MG/1
100 CAPSULE ORAL 2 TIMES DAILY
Qty: 8 CAP | Refills: 0 | Status: SHIPPED | OUTPATIENT
Start: 2019-06-27 | End: 2019-07-01

## 2019-06-27 RX ADMIN — DABIGATRAN ETEXILATE MESYLATE 150 MG: 150 CAPSULE ORAL at 09:05

## 2019-06-27 RX ADMIN — INSULIN LISPRO 2 UNITS: 100 INJECTION, SOLUTION INTRAVENOUS; SUBCUTANEOUS at 11:59

## 2019-06-27 RX ADMIN — INSULIN LISPRO 2 UNITS: 100 INJECTION, SOLUTION INTRAVENOUS; SUBCUTANEOUS at 07:54

## 2019-06-27 RX ADMIN — GUAIFENESIN 600 MG: 600 TABLET, EXTENDED RELEASE ORAL at 09:05

## 2019-06-27 RX ADMIN — ATORVASTATIN CALCIUM 40 MG: 20 TABLET, FILM COATED ORAL at 09:05

## 2019-06-27 RX ADMIN — LABETALOL HYDROCHLORIDE 200 MG: 200 TABLET, FILM COATED ORAL at 09:05

## 2019-06-27 NOTE — PROGRESS NOTES
Problem: Mobility Impaired (Adult and Pediatric)  Goal: *Acute Goals and Plan of Care (Insert Text)  Description  Physical Therapy Goals  Initiated 6/26/2019  1. Patient will move from supine to sit and sit to supine , scoot up and down and roll side to side in bed with independence within 7 day(s). 2.  Patient will transfer from bed to chair and chair to bed with independence using the least restrictive device within 7 day(s). 3.  Patient will perform sit to stand with modified independence within 7 day(s). 4.  Patient will ambulate with modified independence for 200 feet with the least restrictive device within 7 day(s). Note:   PHYSICAL THERAPY TREATMENT  Patient: Jose Roberto Abernathy (89 y.o. female)  Date: 6/27/2019  Diagnosis: Hyponatremia [E87.1] <principal problem not specified>       Precautions: Fall  Chart, physical therapy assessment, plan of care and goals were reviewed. ASSESSMENT:  Pt comes to sit with CGA. Pt to stand with CGA. Pt ambulated 130ft with RW CGA. Pt back to bed with CGA. Pt progressing with mobility. Continue goals. Progression toward goals:  ?    Improving appropriately and progressing toward goals  ? Improving slowly and progressing toward goals  ? Not making progress toward goals and plan of care will be adjusted     PLAN:  Patient continues to benefit from skilled intervention to address the above impairments. Continue treatment per established plan of care.   Discharge Recommendations:  Home Health safety eval  Further Equipment Recommendations for Discharge:  rolling walker     SUBJECTIVE:       OBJECTIVE DATA SUMMARY:   Critical Behavior:  Neurologic State: Alert  Orientation Level: Oriented X4  Cognition: Appropriate decision making, Appropriate for age attention/concentration, Appropriate safety awareness, Follows commands  Safety/Judgement: Fall prevention, Home safety, Insight into deficits, Awareness of environment  Functional Mobility Training:  Bed Mobility:  Rolling: Contact guard assistance  Supine to Sit: Contact guard assistance              Transfers:  Sit to Stand: Contact guard assistance  Stand to Sit: Contact guard assistance                             Balance:  Sitting: Intact  Standing: Intact; Without support  Ambulation/Gait Training:  Distance (ft): 130 Feet (ft)  Assistive Device: Walker, rolling;Gait belt  Ambulation - Level of Assistance: Contact guard assistance                       Speed/Gunjan: Pace decreased (<100 feet/min)                 Pain:  Pain Scale 1: Numeric (0 - 10)  Pain Intensity 1: 0              Activity Tolerance:   Pt tolerated treatment well. Please refer to the flowsheet for vital signs taken during this treatment. After treatment:   ?    Patient left in no apparent distress sitting up in chair  ? Patient left in no apparent distress in bed  ? Call bell left within reach  ? Nursing notified  ? Caregiver present  ?     Bed alarm activated    COMMUNICATION/COLLABORATION:   The patient?s plan of care was discussed with: Physical Therapist    Paulino Sharma PTA   Time Calculation: 23 mins

## 2019-06-27 NOTE — DISCHARGE INSTRUCTIONS
HOSPITALIST DISCHARGE INSTRUCTIONS  NAME: Brad Oconnor   :  1935   MRN:  [id number]     Date/Time:  2019 3:21 PM    ADMIT DATE: 2019     DISCHARGE DATE: 2019     ADMITTING DIAGNOSIS:  Acute kidney failure   Acute bronchitis     DISCHARGE DIAGNOSIS:  As above     MEDICATIONS:  You are on a very high dose of hydrochlorathiazide that was held as you were dehydrated on admission. Please follow-up with whomever prescribed that medicine within the week. · It is important that you take the medication exactly as they are prescribed. · Keep your medication in the bottles provided by the pharmacist and keep a list of the medication names, dosages, and times to be taken in your wallet. · Do not take other medications without consulting your doctor. Pain Management: per above medications    What to do at Home    Recommended diet:  Cardiac, diabetic    Recommended activity: Activity as tolerated     If you experience any of the following symptoms then please call your primary care physician or return to the emergency room if you cannot get hold of your doctor:  Fever, chills, nausea, vomiting, diarrhea, change in mentation, falling, bleeding, shortness of breath, chest     Follow Up:  SEE ABOVE underlined     Information obtained by :  I understand that if any problems occur once I am at home I am to contact my physician. I understand and acknowledge receipt of the instructions indicated above.                                                                                                                                            Physician's or R.N.'s Signature                                                                  Date/Time                                                                                                                                              Patient or Representative Signature                                                          Date/Time

## 2019-06-27 NOTE — DIABETES MGMT
[facility]    [facility] Consult Note    Recommendations/ Comments: Patient receptive to assessment and education, provided over 2 sessions 6/26/19 and 6/27/19. Patient reports she is followed by her PCP, Dr. Helen Esposito, for diabetes management. She is followed every 6 months, last OV a couple of days prior to admission. She reports only medication changes have been to lower dose of oral medications as A1c has improved. Patient had been refusing insulin 6/26/19, stating \"I don't take insulin. \" I explained to patient that her oral DM medications were being held during hospitalization, due to possible interference with other medications or procedures. Patient has agreed to insulin injections as required as of dinnertime yesterday. Patient lives alone, and reports she was independent with ADLs prior to admission. Patient reports she infrequently checks her BG at home. Denies hypoglycemia. She reports she eats 3 small meals at home with unsweetened beverages, noting decreased appetite with recent illness. She avoids caffeine due to effects on heart rate. Educated patient on need to have glucose tablets available to treat any hypoglycemia after Precose administration. If appropriate, please consider: No recommendations at this time. Current hospital DM medication:   Lispro normal sensitivity correction scale    Consult received for:  [x]             Assessment of home management                []      Medication Recommendations                []             Meter/monitoring     []             Insulin instruction     []             New diagnosis     []             Outpatient education     []             Insulin pump patient     []             Insulin infusion     []             DKA/HHS    Chart reviewed and initial evaluation complete on Derian Kortney.     Patient is a 81 y.o. female with known Type 2 Diabetes on oral agents (dual therapy): metformin (generic) 500 mg TID, acarbose (Precose) 50 mg BID at home. BG monitoring at home: infrequent. Assessed and instructed patient on the following:   ·  interpretation of lab results: reviewed A1c with eAG and goal  · blood sugar goals: reviewed preprandial and postprandial targets  · hypoglycemia prevention and treatment: reviewed 15:15 and precautions with Precose  · SMBG skills: patient owns meter, advised to always have testing supplies available at home   · Nutrition: see above  · referred to Diabetes Educator--A1c currently at goal; patient going home with home health orders, unable to drive at this time    Encouraged the following:   · dietary modifications: consistent carb intake using Balanced Plate method   · regular blood sugar monitoring: weekly or biweekly, and more frequently as needed for illness, poor appetite, or health status changes    Provided patient with the following: [x]             Diabetes Self Care Guide               []             Insulin education materials               []             CHO counting education materials               [x]             Outpatient DTC contact number               []             Glucometer               Discussed with patient and/or family need for follow up appointment for diabetes management after discharge. A1c:   Lab Results   Component Value Date/Time    Hemoglobin A1c 6.3 06/27/2019 05:13 AM       Recent Glucose Results:   Lab Results   Component Value Date/Time     (H) 06/27/2019 05:13 AM    GLUCPOC 161 (H) 06/27/2019 11:14 AM    GLUCPOC 153 (H) 06/27/2019 06:36 AM    GLUCPOC 150 (H) 06/26/2019 09:39 PM        Lab Results   Component Value Date/Time    Creatinine 0.78 06/27/2019 05:13 AM     Estimated Creatinine Clearance: 49.9 mL/min (based on SCr of 0.78 mg/dL). Active Orders   Diet    DIET CARDIAC Regular; Consistent Carb 1800kcal        PO intake: No data found. Will continue to follow as needed. Thank you.   Evelyn Mcdonough, RN  Office [phone]  Pager [phone]      Time spent: 30 min

## 2019-06-27 NOTE — PROGRESS NOTES
6/27/2019     3:36 PM  [facility] unable to service pt prior to PCP visit. CM completed referral to [facility] who accepted with [facility] on Friday. CM informed them pt is a bundle. RW delivered through [employer]. Pt's DTR will transport pt. No additional DC service needs indicated. 10:35 AM  CM consult for HH noted. CM completed referral to [facility] and is awaiting response. [facility] was notified pt is a bundle.

## 2019-06-27 NOTE — PROGRESS NOTES
RT evaluation for home oxygen needs was completed. Resting room air O2 saturation was 96% with heart rate of 68 bpm.  While ambulating in hallway on room air and use of rolling walker her O2 saturation ranged from 93-95% with heart rate of 68-72 bpm.  No supplemental oxygen was indicated.

## 2019-06-27 NOTE — PROGRESS NOTES
Patient received discharge instructions and 2 scripts which have all been read and explained to her and her family.  Verbalized understanding

## 2019-06-27 NOTE — PROGRESS NOTES
Bedside and Verbal shift change report given to John Sanchez (oncoming nurse) by Gracie Serrano (offgoing nurse). Report included the following information SBAR, Kardex, Intake/Output, MAR and Recent Results.

## 2019-06-27 NOTE — PROGRESS NOTES
Problem: Self Care Deficits Care Plan (Adult)  Goal: *Acute Goals and Plan of Care (Insert Text)  Description  Occupational Therapy Goals  Initiated 6/26/2019  1. Patient will perform grooming standing at sink with supervision/set-up within 7 day(s). 2.  Patient will perform upper body dressing with supervision/set-up within 7 day(s). 3.  Patient will perform lower body dressing with supervision/set-up within 7 day(s). 4.  Patient will perform toilet transfers with supervision/set-up within 7 day(s). 5.  Patient will perform all aspects of toileting with supervision/set-up within 7 day(s). 6.  Patient will participate in upper extremity therapeutic exercise/activities with supervision/set-up for 5 minutes within 7 day(s). 7.  Patient will utilize energy conservation techniques during functional activities with verbal cues within 7 day(s). Outcome: Progressing Towards Goal   OCCUPATIONAL THERAPY TREATMENT  Patient: Collier Cabot (2 y.o. female)  Date: 6/27/2019  Diagnosis: Hyponatremia [E87.1] <principal problem not specified>       Precautions: Fall  Chart, occupational therapy assessment, plan of care, and goals were reviewed. ASSESSMENT:  Pt received supine in bed and agreeable to therapy. Pt with SBA level for bed mobility with encouragement as she was stating that she could not sit up by herself. Pt with CGA level for transfers and functional mobility with RW, no LOB or unsteadiness. Pt with Min A level for LB dressing to reach foot. Educated and verbalized understanding of use of a reacher to aid in dressing, pt already has reacher at home. Pt with concerns about safety at home, daughter present for session and feels that pt's functional performance has improved since admission, had questions about equipment for use at home. Educated on use of a BSC with daughter stating she would purchase one for pt. Recommend home health at discharge for safety. Progression toward goals:  ? Improving appropriately and progressing toward goals  ? Improving slowly and progressing toward goals  ? Not making progress toward goals and plan of care will be adjusted     PLAN:  Patient continues to benefit from skilled intervention to address the above impairments. Continue treatment per established plan of care. Discharge Recommendations:  Home Health  Further Equipment Recommendations for Discharge:  bedside commode     SUBJECTIVE:   Patient stated I need help to get out of bed.     OBJECTIVE DATA SUMMARY:   Cognitive/Behavioral Status:  Neurologic State: Alert  Orientation Level: Oriented X4  Cognition: Appropriate decision making; Appropriate for age attention/concentration; Follows commands  Perception: Appears intact  Perseveration: No perseveration noted  Safety/Judgement: Awareness of environment; Fall prevention;Home safety    Functional Mobility and Transfers for ADLs:  Bed Mobility:  Rolling: Stand-by assistance  Supine to Sit: Stand-by assistance  Sit to Supine: Stand-by assistance    Transfers:  Sit to Stand: Stand-by assistance  Functional Transfers  Bathroom Mobility: Contact guard assistance  Toilet Transfer : Contact guard assistance  Adaptive Equipment: Walker (comment)(rolling)       Balance:  Sitting: Intact  Standing: Intact    ADL Intervention:  Grooming  Washing Hands: Contact guard assistance(standing at sink)    Lower Body Dressing Assistance  Underpants: Minimum assistance    Toileting  Clothing Management: Minimum assistance    Cognitive Retraining  Safety/Judgement: Awareness of environment; Fall prevention;Home safety    Pain:  Pain Scale 1: Numeric (0 - 10)  Pain Intensity 1: 0    Activity Tolerance:   Good  Please refer to the flowsheet for vital signs taken during this treatment. After treatment:   ? Patient left in no apparent distress sitting up in chair  ? Patient left in no apparent distress in bed  ? Call bell left within reach  ? Nursing notified  ?  Caregiver present  ? Bed alarm activated    COMMUNICATION/COLLABORATION:   The patients plan of care was discussed with: Physical Therapist and Registered Nurse,     Regarding student involvement in patient care:  A student participated in this treatment session. Per CMS Medicare statements and AOTA guidelines I certify that the following was true:  1. I was present and directly observed the entire session. 2. I made all skilled judgments and clinical decisions regarding care. 3. I am the practitioner responsible for assessment, treatment, and documentation.      Sinan Franklin  Time Calculation: 49 mins

## 2019-07-15 NOTE — DISCHARGE SUMMARY
Physician Discharge Summary     Patient ID:  Brian Elliott  [id number]  81 y.o.  1935    Admit date: 6/25/2019    Discharge date and time: 6/27/2019     Admission Diagnoses: Hyponatremia [E87.1]    Discharge Diagnoses/Hospital Course   82 yo hx of HTN, DM, afib s/p ablation, heart block s/p pacer, presented w/ weakness, URI symptoms, elevated pro BNP, hyponatremia, NOHELIA     1) Acute bronchitis: viral vs bacterial.  Viral panel neg. Initially on IV antibiotics which were de-escalated to PO      2) Elevated pro-BNP: likely 2/2 NOHELIA. No evidence of pulm edema on x-ray. Actually intravascularly dry. Echo with EF 55-60%     3) NOHELIA: due to pre-renal. Improved with IVF's     4) Hyponatremia: due to dehydration and HCTZ. Stop indefinitely     5) HTN/Afib: s/p ablation, pacer. On BB, pradaxa, statin     6) Edema: mild. Possibly 2/2 amlodipine. Would consider stopping. LE dopplers neg for DVT.    7) Lower back pain: acute on chronic. Lumbar x-ray showed DDD.    8) Weakness/Debility: evaluated by PT/OT who recommended home health PT/OT, rolling walker and a bedside commode all of which were ordered at discharge     PCP: Rajni Thomas MD     Consults: None     Discharge Exam:  Visit Vitals  /60 (BP 1 Location: Right arm, BP Patient Position: Sitting;Post activity)   Pulse 70   Temp 97.5 °F (36.4 °C)   Resp 16   Ht 5' 3\" (1.6 m)   Wt 65.8 kg (145 lb 1.6 oz)   SpO2 96%   Breastfeeding?  No   BMI 25.70 kg/m²       Gen:  Elderly, frail, NAD  HEENT:  Pink conjunctivae, PERRL, hearing intact to voice, moist mucous membranes  Neck:  Supple, without masses, thyroid non-tender  Resp:  No accessory muscle use, clear breath sounds without wheezes rales or rhonchi  Card:  No murmurs, normal S1, S2 without thrills, mild edema  Abd:  Soft, non-tender, non-distended, normoactive bowel sounds are present  Musc:  No cyanosis or clubbing  Skin:  No rashes   Neuro:  Cranial nerves 3-12 are grossly intact, follows commands appropriately  Psych:  Good insight, oriented to person, place and time, alert    Disposition: home     Patient Instructions:   Discharge Medication List as of 6/27/2019  3:57 PM      START taking these medications    Details   benzonatate (TESSALON) 100 mg capsule Take 1 Cap by mouth three (3) times daily as needed for Cough for up to 7 days. , Print, Disp-15 Cap, R-0      doxycycline (MONODOX) 100 mg capsule Take 1 Cap by mouth two (2) times a day for 4 days. , Print, Disp-8 Cap, R-0         CONTINUE these medications which have NOT CHANGED    Details   diclofenac (VOLTAREN) 1 % gel Apply 2 g to affected area four (4) times daily as needed (applies to shoulder and back). , Historical Med      ferrous sulfate 325 mg (65 mg iron) tablet Take 325 mg by mouth Daily (before breakfast). , Historical Med      labetalol (NORMODYNE) 200 mg tablet Take 200 mg by mouth two (2) times a day., Historical Med      lisinopril (PRINIVIL, ZESTRIL) 5 mg tablet Take 5 mg by mouth daily. , Historical Med      atorvastatin (LIPITOR) 40 mg tablet Take 40 mg by mouth daily. , Historical Med      cholecalciferol, vitamin D3, (VITAMIN D3) 2,000 unit tab Take 2,000 Units by mouth daily. , Historical Med      cyanocobalamin 1,000 mcg tablet Take 1,000 mcg by mouth daily. , Historical Med      vit C/E/Zn/coppr/lutein/zeaxan (PRESERVISION AREDS 2 PO) Take 1 Cap by mouth two (2) times a day., Historical Med      amLODIPine (NORVASC) 2.5 mg tablet Take 2.5 mg by mouth two (2) times a day., Historical Med      metFORMIN (GLUCOPHAGE) 500 mg tablet Take 500 mg by mouth three (3) times daily (with meals). , Historical Med      DOCOSAHEXANOIC ACID/EPA (FISH OIL PO) Take 3,000 mg by mouth daily. , Historical Med      dabigatran etexilate (PRADAXA) 150 mg capsule Take 150 mg by mouth two (2) times a day., Historical Med      acarbose (PRECOSE) 50 mg tablet Take 50 mg by mouth two (2) times daily (with meals). , Historical Med         STOP taking these medications hydroCHLOROthiazide (HYDRODIURIL) 50 mg tablet Comments: NOHELIA, hyponatremia   Reason for Stopping:             Activity: as tolerated     Follow-up with Ashley Watson MD     Approximate time spent in patient care on day of discharge: 35 minutes     Signed:  Mmeo Quezada MD  7/15/2019  7:34 PM

## 2019-08-14 ENCOUNTER — CLINICAL SUPPORT (OUTPATIENT)
Dept: CARDIOLOGY CLINIC | Age: 84
End: 2019-08-14

## 2019-08-14 DIAGNOSIS — Z95.0 CARDIAC PACEMAKER IN SITU: Primary | ICD-10-CM

## 2019-11-14 ENCOUNTER — OFFICE VISIT (OUTPATIENT)
Dept: CARDIOLOGY CLINIC | Age: 84
End: 2019-11-14

## 2019-11-14 DIAGNOSIS — Z95.0 CARDIAC PACEMAKER IN SITU: Primary | ICD-10-CM

## 2020-02-18 ENCOUNTER — TELEPHONE (OUTPATIENT)
Dept: CARDIOLOGY CLINIC | Age: 85
End: 2020-02-18

## 2020-02-18 ENCOUNTER — OFFICE VISIT (OUTPATIENT)
Dept: CARDIOLOGY CLINIC | Age: 85
End: 2020-02-18

## 2020-02-18 DIAGNOSIS — Z95.0 CARDIAC PACEMAKER IN SITU: Primary | ICD-10-CM

## 2020-02-18 NOTE — TELEPHONE ENCOUNTER
Patient is calling as she sent a remote check this morning and wanted to verify it was received. [employer] and it does not show that it was received as of yet. Please advise.     Phone: [phone]

## 2020-08-19 ENCOUNTER — CLINICAL SUPPORT (OUTPATIENT)
Dept: CARDIOLOGY CLINIC | Age: 85
End: 2020-08-19
Payer: MEDICARE

## 2020-08-19 DIAGNOSIS — Z95.0 CARDIAC PACEMAKER IN SITU: Primary | ICD-10-CM

## 2020-08-19 PROCEDURE — 93279 PRGRMG DEV EVAL PM/LDLS PM: CPT

## 2020-11-25 ENCOUNTER — TELEPHONE (OUTPATIENT)
Dept: CARDIOLOGY CLINIC | Age: 85
End: 2020-11-25

## 2020-11-25 NOTE — TELEPHONE ENCOUNTER
Pt's daughter states they had to order a new reader, she contacted [employer] and they will deliver a new reader next week.  Please advise      HARRISON:[phone]

## 2020-11-25 NOTE — TELEPHONE ENCOUNTER
Returned daughter's call. New monitor should arrive next week. We can process a remote at that time. No other concerns noted. Appreciative of call back.

## 2020-12-11 ENCOUNTER — TELEPHONE (OUTPATIENT)
Dept: CARDIOLOGY CLINIC | Age: 85
End: 2020-12-11

## 2020-12-11 NOTE — TELEPHONE ENCOUNTER
Patient requesting to speak to you in regards to letter sent to her \"home about pacemaker not working. \"    Phone: [phone]

## 2020-12-14 ENCOUNTER — TELEPHONE (OUTPATIENT)
Dept: CARDIOLOGY CLINIC | Age: 85
End: 2020-12-14

## 2020-12-14 NOTE — TELEPHONE ENCOUNTER
Returned pt's call to clarify - I was looking for more information from her pacer via the remote transmission. Her pacer is working properly. Set next remote date for 3/16/20 as she send manually. She wrote it on her calender.

## 2020-12-18 ENCOUNTER — CLINICAL SUPPORT (OUTPATIENT)
Dept: CARDIOLOGY CLINIC | Age: 85
End: 2020-12-18

## 2020-12-18 DIAGNOSIS — Z95.0 CARDIAC PACEMAKER IN SITU: Primary | ICD-10-CM

## 2020-12-18 NOTE — PROGRESS NOTES
no charge/MDT pacer ck/thresholds for possible device reset w/ rep. .    See scanned document for details.

## 2021-03-16 ENCOUNTER — TELEPHONE (OUTPATIENT)
Dept: CARDIOLOGY CLINIC | Age: 86
End: 2021-03-16

## 2021-03-16 NOTE — TELEPHONE ENCOUNTER
Patient is calling to see if her remote transmission was received. Please advise.     Phone #: [phone]  Thanks

## 2021-03-16 NOTE — TELEPHONE ENCOUNTER
L/m for pt to inform her that we didn't receive her [employer] transmission. She can call [employer] Pt Support [phone] for help sending & issues with the box.

## 2021-03-17 NOTE — TELEPHONE ENCOUNTER
Patient is calling to see if her manual transmission was received. Please advise.     Phone #: [phone]  Thanks

## 2021-03-17 NOTE — TELEPHONE ENCOUNTER
Spoke to pt & she stated she called [employer] & they said her box had an issue so they are sending her a new monitor. She will call after she gets the new Carelink box & sends a transmission to verify working.

## 2021-03-25 ENCOUNTER — TELEPHONE (OUTPATIENT)
Dept: CARDIOLOGY CLINIC | Age: 86
End: 2021-03-25

## 2021-03-25 NOTE — TELEPHONE ENCOUNTER
Ms Dae Henriquez called to let us know she hasn't received her new home monitor yet & she is concerned about sending the transmission on time. Reassured her it would be fine to send her transmission when she receives her new box.

## 2021-03-29 ENCOUNTER — OFFICE VISIT (OUTPATIENT)
Dept: CARDIOLOGY CLINIC | Age: 86
End: 2021-03-29
Payer: MEDICARE

## 2021-03-29 DIAGNOSIS — Z95.0 CARDIAC PACEMAKER IN SITU: Primary | ICD-10-CM

## 2021-03-29 PROCEDURE — 93294 REM INTERROG EVL PM/LDLS PM: CPT | Performed by: INTERNAL MEDICINE

## 2021-07-23 NOTE — PROGRESS NOTES
HISTORY OF PRESENT ILLNESS      Zain Mcfadden is a 81 y.o. female with female with history of pacemaker, diabetes mellitus, dyslipidemia, atrial fibrillation, hypertension, complete heart block who is here for PPM follow up. She has shortness of breath and fatigue with exertion. PAST MEDICAL HISTORY     Past Medical History:   Diagnosis Date    Atrial fibrillation ([address]     Cataract     Diabetes ([facility] [facility]     Hypercholesterolemia     Hypertension     Pacemaker     Metronic generator changed on 5/3/18           PAST SURGICAL HISTORY     Past Surgical History:   Procedure Laterality Date    COLONOSCOPY  4/14/2016         HX AFIB ABLATION      HX COLONOSCOPY      HX PACEMAKER  2007    Medtronic 2007/gen change 5/3/18          ALLERGIES     No Known Allergies       FAMILY HISTORY     History reviewed. No pertinent family history. negative for cardiac disease       SOCIAL HISTORY     Social History     Socioeconomic History    Marital status: SINGLE     Spouse name: Not on file    Number of children: Not on file    Years of education: Not on file    Highest education level: Not on file   Tobacco Use    Smoking status: Never Smoker    Smokeless tobacco: Never Used   Substance and Sexual Activity    Alcohol use: No    Drug use: No     Social Determinants of Health     Financial Resource Strain:     Difficulty of Paying Living Expenses:    Food Insecurity:     Worried About Running Out of Food in the Last Year:     [address] in the Last Year:    Transportation Needs:     Lack of Transportation (Medical):      Lack of Transportation (Non-Medical):    Physical Activity:     Days of Exercise per Week:     Minutes of Exercise per Session:    Stress:     Feeling of Stress :    Social Connections:     Frequency of Communication with Friends and Family:     Frequency of Social Gatherings with Friends and Family:     Attends Holiness Services:     Active Member of Clubs or Organizations:     Attends Club or Organization Meetings:     Marital Status:          MEDICATIONS     Current Outpatient Medications   Medication Sig    sertraline (ZOLOFT) 25 mg tablet Take 25 mg by mouth daily.  furosemide (Lasix) 40 mg tablet Take 40 mg by mouth daily.  acetaminophen (TylenoL) 325 mg tablet Take  by mouth every four (4) hours as needed for Pain.  famotidine (Pepcid) 20 mg tablet Take 20-40 mg by mouth two (2) times daily as needed for Heartburn.  ferrous sulfate 325 mg (65 mg iron) tablet Take 325 mg by mouth Daily (before breakfast).  labetalol (NORMODYNE) 200 mg tablet Take 200 mg by mouth two (2) times a day.  lisinopriL (PRINIVIL, ZESTRIL) 2.5 mg tablet Take 2.5 mg by mouth daily.  atorvastatin (LIPITOR) 40 mg tablet Take 40 mg by mouth daily.  cholecalciferol, vitamin D3, (VITAMIN D3) 2,000 unit tab Take 2,000 Units by mouth daily.  cyanocobalamin 1,000 mcg tablet Take 1,000 mcg by mouth daily.  vit C/E/Zn/coppr/lutein/zeaxan (PRESERVISION AREDS 2 PO) Take 1 Cap by mouth two (2) times a day.  DOCOSAHEXANOIC ACID/EPA (FISH OIL PO) Take 1,000 mg by mouth daily.  dabigatran etexilate (PRADAXA) 150 mg capsule Take 150 mg by mouth two (2) times a day.  diclofenac (VOLTAREN) 1 % gel Apply 2 g to affected area four (4) times daily as needed (applies to shoulder and back). (Patient not taking: Reported on 7/26/2021)    amLODIPine (NORVASC) 2.5 mg tablet Take 2.5 mg by mouth two (2) times a day. (Patient not taking: Reported on 7/26/2021)    acarbose (PRECOSE) 50 mg tablet Take 50 mg by mouth two (2) times daily (with meals). (Patient not taking: Reported on 7/26/2021)     No current facility-administered medications for this visit. I have reviewed the nurses notes, vitals, problem list, allergy list, medical history, family, social history and medications. REVIEW OF SYMPTOMS      General: +fatigue, Pt denies excessive weight gain or loss.  Pt is able to conduct ADL's  HEENT: Denies blurred vision, headaches, hearing loss, epistaxis and difficulty swallowing. Respiratory: +sob with exertion, Denies cough, congestion, shortness of breath, SANTOS, wheezing or stridor. Cardiovascular: Denies precordial pain, palpitations, edema or PND  Gastrointestinal: Denies poor appetite, indigestion, abdominal pain or blood in stool  Genitourinary: Denies hematuria, dysuria, increased urinary frequency  Musculoskeletal: Denies joint pain or swelling from muscles or joints  Neurologic: Denies tremor, paresthesias, headache, or sensory motor disturbance  Psychiatric: Denies confusion, insomnia, depression  Integumentray: Denies rash, itching or ulcers. Hematologic: Denies easy bruising, bleeding       PHYSICAL EXAMINATION      Vitals: see vitals section  General: Well developed, in no acute distress. HEENT: No jaundice, oral mucosa moist, no oral ulcers  Neck: Supple, no stiffness, no lymphadenopathy, supple  Heart:  Normal S1/S2 negative S3 or S4. Regular, no murmur, gallop or rub, no jugular venous distention  Respiratory: Clear bilaterally x 4, no wheezing or rales  Abdomen:   Soft, non-tender, bowel sounds are active. Extremities:  No edema, normal cap refill, no cyanosis. Musculoskeletal: No clubbing, no deformities  Neuro: A&Ox3, speech clear, gait stable, cooperative, no focal neurologic deficits  Skin: Skin color is normal. No rashes or lesions.  Non diaphoretic, moist.  Vascular: 2+ pulses symmetric in all extremities       DIAGNOSTIC DATA      EKG:     Visit Vitals  BP (!) 140/72 (BP 1 Location: Left upper arm, BP Patient Position: Sitting)   Pulse 68   Resp 16   Ht 5' 3\" (1.6 m)   Wt 133 lb 12.8 oz (60.7 kg)   SpO2 98%   BMI 23.70 kg/m²          LABORATORY DATA      Lab Results   Component Value Date/Time    WBC 11.6 (H) 06/27/2019 05:13 AM    HGB 10.2 (L) 06/27/2019 05:13 AM    HCT 30.0 (L) 06/27/2019 05:13 AM    PLATELET 019 46/95/7818 05:13 AM    MCV 90.9 06/27/2019 05:13 AM      Lab Results   Component Value Date/Time    Sodium 135 (L) 06/27/2019 05:13 AM    Potassium 3.7 06/27/2019 05:13 AM    Chloride 105 06/27/2019 05:13 AM    CO2 22 06/27/2019 05:13 AM    Anion gap 8 06/27/2019 05:13 AM    Glucose 143 (H) 06/27/2019 05:13 AM    BUN 21 (H) 06/27/2019 05:13 AM    Creatinine 0.78 06/27/2019 05:13 AM    BUN/Creatinine ratio 27 (H) 06/27/2019 05:13 AM    GFR est AA >60 06/27/2019 05:13 AM    GFR est non-AA >60 06/27/2019 05:13 AM    Calcium 8.4 (L) 06/27/2019 05:13 AM    Bilirubin, total 1.3 (H) 06/26/2019 06:42 AM    Alk. phosphatase 135 (H) 06/26/2019 06:42 AM    Protein, total 5.6 (L) 06/26/2019 06:42 AM    Albumin 2.5 (L) 06/26/2019 06:42 AM    Globulin 3.1 06/26/2019 06:42 AM    A-G Ratio 0.8 (L) 06/26/2019 06:42 AM    ALT (SGPT) 33 06/26/2019 06:42 AM         ASSESSMENT/PLAN         1. Pacemaker                        A.  Left-sided                        B. Medtronic                        C. Pacemaker dependent  2. Complete heart block  3. Atrial fibrillation   Currently on Pradaxa  4. Hypertension  5. Dyslipidemia    Continue Pradaxa for CVA risk reduction, she is tolerating well without bleeding issues. Device interrogation shows normal functioning with 99% RVP. Her histograms are flat; however her activity level is very low. Will defer adjustments to RR. Will request most recent echo from Dr. Evan Crow office and if needed will obtain updated echocardiogram through his office. Continue follow ups per device clinic and in office in one year. FOLLOW UP        1 year     Thank you, Jillian Dalton MD and Dr. Clarke Arrieta for allowing me to participate in the care of this extraordinarily pleasant female. Please do not hesitate to contact me for further questions/concerns.        JONAH Yates [facility].  [address], [address] [place] [address], [facility] [address], [facility]    Yany Owens  [phone] / [phone] Fax   [phone] / [phone] Fax

## 2021-07-26 ENCOUNTER — OFFICE VISIT (OUTPATIENT)
Dept: CARDIOLOGY CLINIC | Age: 86
End: 2021-07-26
Payer: MEDICARE

## 2021-07-26 VITALS
SYSTOLIC BLOOD PRESSURE: 140 MMHG | HEIGHT: 63 IN | RESPIRATION RATE: 16 BRPM | DIASTOLIC BLOOD PRESSURE: 72 MMHG | BODY MASS INDEX: 23.71 KG/M2 | OXYGEN SATURATION: 98 % | WEIGHT: 133.8 LBS | HEART RATE: 68 BPM

## 2021-07-26 DIAGNOSIS — Z95.0 CARDIAC PACEMAKER IN SITU: Primary | ICD-10-CM

## 2021-07-26 PROCEDURE — 99215 OFFICE O/P EST HI 40 MIN: CPT | Performed by: NURSE PRACTITIONER

## 2021-07-26 PROCEDURE — G8753 SYS BP > OR = 140: HCPCS | Performed by: NURSE PRACTITIONER

## 2021-07-26 PROCEDURE — G8536 NO DOC ELDER MAL SCRN: HCPCS | Performed by: NURSE PRACTITIONER

## 2021-07-26 PROCEDURE — G8427 DOCREV CUR MEDS BY ELIG CLIN: HCPCS | Performed by: NURSE PRACTITIONER

## 2021-07-26 PROCEDURE — G8754 DIAS BP LESS 90: HCPCS | Performed by: NURSE PRACTITIONER

## 2021-07-26 PROCEDURE — G8432 DEP SCR NOT DOC, RNG: HCPCS | Performed by: NURSE PRACTITIONER

## 2021-07-26 PROCEDURE — G8400 PT W/DXA NO RESULTS DOC: HCPCS | Performed by: NURSE PRACTITIONER

## 2021-07-26 PROCEDURE — G0463 HOSPITAL OUTPT CLINIC VISIT: HCPCS | Performed by: NURSE PRACTITIONER

## 2021-07-26 PROCEDURE — 1090F PRES/ABSN URINE INCON ASSESS: CPT | Performed by: NURSE PRACTITIONER

## 2021-07-26 PROCEDURE — G8420 CALC BMI NORM PARAMETERS: HCPCS | Performed by: NURSE PRACTITIONER

## 2021-07-26 PROCEDURE — 93279 PRGRMG DEV EVAL PM/LDLS PM: CPT | Performed by: NURSE PRACTITIONER

## 2021-07-26 PROCEDURE — 1101F PT FALLS ASSESS-DOCD LE1/YR: CPT | Performed by: NURSE PRACTITIONER

## 2021-07-26 RX ORDER — FUROSEMIDE 40 MG/1
40 TABLET ORAL DAILY
COMMUNITY

## 2021-07-26 RX ORDER — ACETAMINOPHEN 325 MG/1
TABLET ORAL
COMMUNITY

## 2021-07-26 RX ORDER — FAMOTIDINE 20 MG/1
20-40 TABLET, FILM COATED ORAL
COMMUNITY

## 2021-07-26 RX ORDER — SERTRALINE HYDROCHLORIDE 25 MG/1
25 TABLET, FILM COATED ORAL DAILY
COMMUNITY
Start: 2021-07-14

## 2021-07-26 NOTE — LETTER
7/26/2021    Patient: Emile Castro   YOB: 1935   Date of Visit: 7/26/2021     Mathew Pelaez MD  [address] [place] [address]  Via Fax: [phone]    Dear Mathew Pelaez MD,      Thank you for referring Ms. Emile Castro to [facility] for evaluation. My notes for this consultation are attached. If you have questions, please do not hesitate to call me. I look forward to following your patient along with you.       Sincerely,    Balbina Choudhary, JONAH

## 2021-07-26 NOTE — PROGRESS NOTES
Room EP 2  Visit Vitals  BP (!) 140/72 (BP 1 Location: Left upper arm, BP Patient Position: Sitting)   Pulse 68   Resp 16   Ht 5' 3\" (1.6 m)   Wt 133 lb 12.8 oz (60.7 kg)   SpO2 98%   BMI 23.70 kg/m²     Chest pain: no  Shortness of breath: no  Edema: yes, wears compression hose, being treated  Palpitations, Skipped beats, Rapid heartbeat: no  Dizziness: no  Fatigue:yes    New diagnosis/Surgeries: no    ER/Hospitalizations: no    Refills:no

## 2021-07-29 ENCOUNTER — OFFICE VISIT (OUTPATIENT)
Dept: NEUROLOGY | Age: 86
End: 2021-07-29
Payer: MEDICARE

## 2021-07-29 VITALS
DIASTOLIC BLOOD PRESSURE: 82 MMHG | SYSTOLIC BLOOD PRESSURE: 160 MMHG | BODY MASS INDEX: 25.07 KG/M2 | OXYGEN SATURATION: 98 % | TEMPERATURE: 97.9 F | HEART RATE: 79 BPM | WEIGHT: 141.5 LBS

## 2021-07-29 DIAGNOSIS — R41.3 MEMORY LOSS: ICD-10-CM

## 2021-07-29 DIAGNOSIS — R29.2 HYPERREFLEXIA: ICD-10-CM

## 2021-07-29 DIAGNOSIS — R25.1 TREMOR: ICD-10-CM

## 2021-07-29 DIAGNOSIS — R26.81 GAIT INSTABILITY: Primary | ICD-10-CM

## 2021-07-29 DIAGNOSIS — R47.02 DYSPHASIA: ICD-10-CM

## 2021-07-29 DIAGNOSIS — R25.2 SPASTICITY: ICD-10-CM

## 2021-07-29 PROCEDURE — 1090F PRES/ABSN URINE INCON ASSESS: CPT | Performed by: PSYCHIATRY & NEUROLOGY

## 2021-07-29 PROCEDURE — G8419 CALC BMI OUT NRM PARAM NOF/U: HCPCS | Performed by: PSYCHIATRY & NEUROLOGY

## 2021-07-29 PROCEDURE — G8754 DIAS BP LESS 90: HCPCS | Performed by: PSYCHIATRY & NEUROLOGY

## 2021-07-29 PROCEDURE — G8536 NO DOC ELDER MAL SCRN: HCPCS | Performed by: PSYCHIATRY & NEUROLOGY

## 2021-07-29 PROCEDURE — G8753 SYS BP > OR = 140: HCPCS | Performed by: PSYCHIATRY & NEUROLOGY

## 2021-07-29 PROCEDURE — G8510 SCR DEP NEG, NO PLAN REQD: HCPCS | Performed by: PSYCHIATRY & NEUROLOGY

## 2021-07-29 PROCEDURE — 99205 OFFICE O/P NEW HI 60 MIN: CPT | Performed by: PSYCHIATRY & NEUROLOGY

## 2021-07-29 PROCEDURE — 1101F PT FALLS ASSESS-DOCD LE1/YR: CPT | Performed by: PSYCHIATRY & NEUROLOGY

## 2021-07-29 PROCEDURE — G8400 PT W/DXA NO RESULTS DOC: HCPCS | Performed by: PSYCHIATRY & NEUROLOGY

## 2021-07-29 PROCEDURE — G8427 DOCREV CUR MEDS BY ELIG CLIN: HCPCS | Performed by: PSYCHIATRY & NEUROLOGY

## 2021-07-29 NOTE — PROGRESS NOTES
Chief Complaint   Patient presents with    Parkinsons Disease     Evaluation for possible parkinsons.  Difficulty writing, speaking, shaking and tremors, mobility difficulty     Visit Vitals  BP (!) 160/82 (BP 1 Location: Left arm, BP Patient Position: Sitting, BP Cuff Size: Adult)   Pulse 79   Temp 97.9 °F (36.6 °C) (Temporal)   Wt 64.2 kg (141 lb 8 oz)   SpO2 98%   BMI 25.07 kg/m²

## 2021-07-29 NOTE — LETTER
7/29/2021    Patient: Breanne Proctor   YOB: 1935   Date of Visit: 7/29/2021     Jaison Horner MD  [address] [place] [address]  Via Fax: [phone]    Dear Jaison Horner MD,      Thank you for referring Ms. Breanne Proctor to [facility] for evaluation. My notes for this consultation are attached. If you have questions, please do not hesitate to call me. I look forward to following your patient along with you.       Sincerely,    Jeanne Norton MD

## 2021-07-29 NOTE — PROGRESS NOTES
NEUROLOGY CLINIC NOTE    Patient ID:  Chioma Duque  [id number]  89 y.o.  1935    Date of Consultation:  July 29, 2021    Referring Physician: Dr. Korin Mckeon    Reason for Consultation:  Speech issues, walking issues, confusion, tremors    Chief Complaint   Patient presents with    Parkinsons Disease     Evaluation for possible parkinsons. Difficulty writing, speaking, shaking and tremors, mobility difficulty       History of Present Illness:     Patient Active Problem List    Diagnosis Date Noted    Hyponatremia 06/25/2019    Anemia 06/25/2019    Leukocytosis 06/25/2019    Cough 06/25/2019    Hypotension 06/25/2019    [facility]F [facility] failure[facility] ([facility] 06/25/2019    Dehydration 06/25/2019    Weakness 06/25/2019    Atrial fibrillation ([facility])     Cataract     Diabetes ([facility] [address]     Hypercholesterolemia     Pacemaker 04/25/2018    Complete heart block ([facility]) 04/15/2011    AF (atrial fibrillation) ([address] 04/15/2011    HTN (hypertension), benign 04/15/2011    Orthostatic hypotension 04/15/2011     Past Medical History:   Diagnosis Date    Atrial fibrillation ([address]     Cataract     Diabetes ([address]     Hypercholesterolemia     Hypertension     Pacemaker     Metronic generator changed on 5/3/18      Past Surgical History:   Procedure Laterality Date    COLONOSCOPY  4/14/2016         HX AFIB ABLATION      HX COLONOSCOPY      HX PACEMAKER  2007    Medtronic 2007/gen change 5/3/18      Prior to Admission medications    Medication Sig Start Date End Date Taking? Authorizing Provider   sertraline (ZOLOFT) 25 mg tablet Take 25 mg by mouth daily. 7/14/21  Yes Provider, Historical   furosemide (Lasix) 40 mg tablet Take 40 mg by mouth daily. Yes Provider, Historical   acetaminophen (TylenoL) 325 mg tablet Take  by mouth every four (4) hours as needed for Pain. Yes Provider, Historical   famotidine (Pepcid) 20 mg tablet Take 20-40 mg by mouth two (2) times daily as needed for Heartburn.    Yes Provider, Historical   ferrous sulfate 325 mg (65 mg iron) tablet Take 325 mg by mouth Daily (before breakfast). Yes Provider, Historical   labetalol (NORMODYNE) 200 mg tablet Take 200 mg by mouth two (2) times a day. Yes Provider, Historical   lisinopriL (PRINIVIL, ZESTRIL) 2.5 mg tablet Take 2.5 mg by mouth daily. Yes Provider, Historical   atorvastatin (LIPITOR) 40 mg tablet Take 40 mg by mouth daily. Yes Provider, Historical   cholecalciferol, vitamin D3, (VITAMIN D3) 2,000 unit tab Take 2,000 Units by mouth daily. Yes Provider, Historical   cyanocobalamin 1,000 mcg tablet Take 1,000 mcg by mouth daily. Yes Provider, Historical   vit C/E/Zn/coppr/lutein/zeaxan (PRESERVISION AREDS 2 PO) Take 1 Cap by mouth two (2) times a day. Yes Provider, Historical   DOCOSAHEXANOIC ACID/EPA (FISH OIL PO) Take 1,000 mg by mouth daily. Yes Other, MD Lucas   dabigatran etexilate (PRADAXA) 150 mg capsule Take 150 mg by mouth two (2) times a day. Yes Provider, Historical     No Known Allergies   Social History     Tobacco Use    Smoking status: Never Smoker    Smokeless tobacco: Never Used   Substance Use Topics    Alcohol use: No      No family history on file. Subjective:      Mike Manley is a 81 y.o. RHWF history of atrial fibrillation on chronic anticoagulation, status post pacemaker placement, diabetes, hypercholesterolemia, hypertension, anxiety and lower extremity edema was referred here by Dr. Giovany Harris for further evaluation of the question whether she has Parkinson's disease. Patient was last seen by cardiology 7/26/2021. Note mentions issues with shortness of breath and fatigue with exertion. Per daughter who accompanies the patient with the patient concurring condition has been ongoing for about 1-1/2 years. Initially noted with problems with her signature. Then developing more issues with her legs and problems walking without any aid. Felt that she was getting weaker.   He also mentions issues with memory lapses and behavior changes. Patient with appearance of being agitated or anxious or having panic attacks. Also some periods of paranoia. She apparently was referred to Dr. Cori Bruner (neurologist) and was seen by virtual visit October 2020. No work-up was ordered. Since then, patient has been residing at [facility]. They are she is getting speech therapy, physical and occupational therapy. Apparently she has been having periods of inability to say what she wants to say or aphasia. And sometimes episodes of talking nonsensically. Seems to be more evident when she gets tired at the end of the day. She has been having problems with lower leg edema and is currently wearing compression stockings. This is also affected her ability to walk. Better when the edema improves. She currently uses a rolling walker. No falls. She is also been having some shortness of breath with exertion or physical activity. Patient also reports having leg tremors that she can stop when she becomes aware of it. Very minimal or mild upper extremity tremors. No resting pill-rolling tremors. Outside reports reviewed: office notes.     Review of Systems:    A comprehensive review of systems was performed:   Constitutional: positive for none  Eyes: positive for vision problems  Ears, nose, mouth, throat, and face: positive for none  Respiratory: positive for shortness of breath  Cardiovascular: positive for leg swelling, high blood pressure  Gastrointestinal: positive for none  Genitourinary: positive for none  Integument/breast: positive for none  Hematologic/lymphatic: positive for none  Musculoskeletal: positive for weakness  Neurological: positive for memory loss, falls  Behavioral/Psych: positive for anxiety  Endocrine: positive for none  Allergic/Immunologic: positive for none      Objective:     Visit Vitals  BP (!) 160/82 (BP 1 Location: Left arm, BP Patient Position: Sitting, BP Cuff Size: Adult)   Pulse 79   Temp 97.9 °F (36.6 °C) (Temporal)   Wt 64.2 kg (141 lb 8 oz)   SpO2 98%   BMI 25.07 kg/m²       PHYSICAL EXAM:    General Appearance: Alert, patient appears stated age. General:  Well developed, well nourished, patient in no apparent distress. Head/Face: The head is normocephalic and atraumatic. Eyes: Conjunctivae appear normal. Sclera appear normal and non-icteric. Nose (and Sinus):   No abnormality of the nose or sinuses is noted. Oral:   Throat is clear. Lymphatics:  No lymphadenopathy in the neck/head. Neck and Thyroid:   No bruits noted in the neck. Respiratory:  Lungs clear to auscultation. Cardiovascular:  Palpation and auscultation: regular rate and rhythm. Extremity: No joint swelling. LE edema, support stockings      NEUROLOGICAL EXAM:    Appearance: The patient is well developed, well nourished, provides a coherent history and is in no acute distress. Mental Status: Oriented to time, place and person. Fluent, no aphasia or dysarthria. Mood and affect appropriate. Cranial Nerves:   Intact visual fields. VALENTE, EOM's full, no nystagmus, no ptosis. Facial sensation is normal. Corneal reflexes are intact. Facial movement is symmetric. Hearing is normal bilaterally. Palate is midline with normal elevation. Sternocleidomastoid and trapezius muscles are normal. Tongue is midline.    (+) glabellar response. Motor:  5/5 strength in upper and lower proximal and distal muscles. Normal bulk and increase tone all 4 extremities. No pronator drift. Reflexes:   Deep tendon reflexes 3+/4 and symmetrical. Downgoing toes. Sensory:   Normal to cold, pinprick and vibration. Gait:  Able to stand from sitting with some difficulty but independently. Walked using a rolling walker with no shuffling. Turns en bloc. No Romberg. Tremor:   Very minimal BUE postural tremors. LE tremulousness. Cerebellar:  Intact FTN/YANA/HTS with some mild bradykinesia. Assessment:   Gait instability  Memory loss  Dysphasia  Hyperreflexia  Tremor  Spasticity    Plan:   Neurological examination reveals increased tone in all 4 extremities but no cogwheel rigidity, positive glabellar response but no masked facies or monotonous voice, intact cerebellar testing with very mild bradykinesia, lower extremity tremulousness and very minimal bilateral upper extremity postural tremors with no resting pill-rolling tremor, generalized hyperreflexia, able to stand from sitting with some difficulty but independently and uses rolling walker with no shuffling gait. More stiff and unable to pivot and turns en bloc. No Romberg. No findings to support typical changes seen in Parkinson's disease. May need to consider possible parkinsonism. Given history of atrial fibrillation patient certainly at risk for silent strokes. Also need to assess for cervical and lumbar spondylosis with myelopathy. Head CT without contrast was ordered. Cervical and lumbar CT without contrast was also ordered. Further intervention be done pending results of testing. Patient with cognitive changes and given her history likely has some form of vascular dementia. Fluctuation is not consistent with Alzheimer's. Continue supervision. Patient with episodes of what they call aphasia or difficulty trying to say the words that she wants to say. Likely prone to fluctuations in mentation especially if head CT shows significant white matter disease. Consider more likely patient having spells of encephalopathy. May need EEG if persist.    Patient has widespread hyperreflexia I need to assess for possible cervical or lumbar myelopathy. CT scans ordered as above. Patient with lower extremity tremor and very mild postural upper extremity tremors. At worst patient may likely have elements of essential tremors. Tremors not typical for Parkinson's. It currently does not affect patient's activities of daily living. Monitor for now. Patient has widespread increased tone and spastic gait. Again need to assess for possible basal ganglia pathology with a head CT and cervical and lumbar stenosis by obtaining CT scans. Further intervention be done pending results of testing. Visit lasted 65 minutes. Greater than 50% was spent discussing her condition, potential etiology, prognosis, no evidence on current exam of classic findings of Parkinson's disease that could be parkinsonism, tremors are more consistent with benign tremors but not parkinsonian, need to assess for possible strokes or significant white matter disease given history of atrial fibrillation with head CT, need to assess for possible cervical and lumbar spondylosis with spinal stenosis by obtaining CT of the lumbar and cervical spine, continue supervision, continue fall precautions    This note was created using voice recognition software. Despite editing, there may be syntax errors.

## 2021-08-03 RX ORDER — GALCANEZUMAB 120 MG/ML
120 INJECTION, SOLUTION SUBCUTANEOUS ONCE
Qty: 1 ML | Refills: 0 | Status: SHIPPED | COMMUNITY
Start: 2021-08-03 | End: 2021-08-03 | Stop reason: CLARIF

## 2021-08-06 ENCOUNTER — HOSPITAL ENCOUNTER (EMERGENCY)
Age: 86
Discharge: HOME | End: 2021-08-06
Attending: EMERGENCY MEDICINE
Payer: MEDICARE

## 2021-08-06 ENCOUNTER — HOSPITAL ENCOUNTER (OUTPATIENT)
Dept: CT IMAGING | Age: 86
Discharge: HOME | End: 2021-08-06
Attending: PSYCHIATRY & NEUROLOGY
Payer: MEDICARE

## 2021-08-06 VITALS
HEIGHT: 63 IN | HEART RATE: 70 BPM | OXYGEN SATURATION: 99 % | SYSTOLIC BLOOD PRESSURE: 120 MMHG | TEMPERATURE: 96.8 F | DIASTOLIC BLOOD PRESSURE: 68 MMHG | BODY MASS INDEX: 25.08 KG/M2 | RESPIRATION RATE: 18 BRPM | WEIGHT: 141.54 LBS

## 2021-08-06 DIAGNOSIS — R26.81 GAIT INSTABILITY: ICD-10-CM

## 2021-08-06 DIAGNOSIS — R25.1 TREMOR: ICD-10-CM

## 2021-08-06 DIAGNOSIS — R25.2 SPASTICITY: ICD-10-CM

## 2021-08-06 DIAGNOSIS — R29.2 HYPERREFLEXIA: ICD-10-CM

## 2021-08-06 DIAGNOSIS — R47.02 DYSPHASIA: ICD-10-CM

## 2021-08-06 DIAGNOSIS — R53.83 FATIGUE, UNSPECIFIED TYPE: Primary | ICD-10-CM

## 2021-08-06 DIAGNOSIS — R41.3 MEMORY LOSS: ICD-10-CM

## 2021-08-06 LAB
ALBUMIN SERPL-MCNC: 3.7 G/DL (ref 3.5–5)
ALBUMIN/GLOB SERPL: 1.1 {RATIO} (ref 1.1–2.2)
ALP SERPL-CCNC: 100 U/L (ref 45–117)
ALT SERPL-CCNC: 25 U/L (ref 12–78)
ANION GAP SERPL CALC-SCNC: 6 MMOL/L (ref 5–15)
APPEARANCE UR: CLEAR
AST SERPL-CCNC: 15 U/L (ref 15–37)
BACTERIA URNS QL MICRO: NEGATIVE /HPF
BASOPHILS # BLD: 0 K/UL (ref 0–0.1)
BASOPHILS NFR BLD: 0 % (ref 0–1)
BILIRUB SERPL-MCNC: 0.7 MG/DL (ref 0.2–1)
BILIRUB UR QL: NEGATIVE
BUN SERPL-MCNC: 19 MG/DL (ref 6–20)
BUN/CREAT SERPL: 20 (ref 12–20)
CALCIUM SERPL-MCNC: 8.9 MG/DL (ref 8.5–10.1)
CHLORIDE SERPL-SCNC: 107 MMOL/L (ref 97–108)
CO2 SERPL-SCNC: 28 MMOL/L (ref 21–32)
COLOR UR: ABNORMAL
CREAT SERPL-MCNC: 0.93 MG/DL (ref 0.55–1.02)
DIFFERENTIAL METHOD BLD: ABNORMAL
EOSINOPHIL # BLD: 0.1 K/UL (ref 0–0.4)
EOSINOPHIL NFR BLD: 2 % (ref 0–7)
EPITH CASTS URNS QL MICRO: ABNORMAL /LPF
ERYTHROCYTE [DISTWIDTH] IN BLOOD BY AUTOMATED COUNT: 15.6 % (ref 11.5–14.5)
GLOBULIN SER CALC-MCNC: 3.3 G/DL (ref 2–4)
GLUCOSE SERPL-MCNC: 111 MG/DL (ref 65–100)
GLUCOSE UR STRIP.AUTO-MCNC: NEGATIVE MG/DL
HCT VFR BLD AUTO: 40.6 % (ref 35–47)
HGB BLD-MCNC: 12.9 G/DL (ref 11.5–16)
HGB UR QL STRIP: ABNORMAL
HYALINE CASTS URNS QL MICRO: ABNORMAL /LPF (ref 0–5)
IMM GRANULOCYTES # BLD AUTO: 0 K/UL (ref 0–0.04)
IMM GRANULOCYTES NFR BLD AUTO: 0 % (ref 0–0.5)
KETONES UR QL STRIP.AUTO: NEGATIVE MG/DL
LEUKOCYTE ESTERASE UR QL STRIP.AUTO: ABNORMAL
LYMPHOCYTES # BLD: 1 K/UL (ref 0.8–3.5)
LYMPHOCYTES NFR BLD: 13 % (ref 12–49)
MCH RBC QN AUTO: 30.6 PG (ref 26–34)
MCHC RBC AUTO-ENTMCNC: 31.8 G/DL (ref 30–36.5)
MCV RBC AUTO: 96.2 FL (ref 80–99)
MONOCYTES # BLD: 0.6 K/UL (ref 0–1)
MONOCYTES NFR BLD: 8 % (ref 5–13)
NEUTS SEG # BLD: 5.8 K/UL (ref 1.8–8)
NEUTS SEG NFR BLD: 77 % (ref 32–75)
NITRITE UR QL STRIP.AUTO: NEGATIVE
NRBC # BLD: 0 K/UL (ref 0–0.01)
NRBC BLD-RTO: 0 PER 100 WBC
PH UR STRIP: 5 [PH] (ref 5–8)
PLATELET # BLD AUTO: 185 K/UL (ref 150–400)
PMV BLD AUTO: 11.5 FL (ref 8.9–12.9)
POTASSIUM SERPL-SCNC: 3.6 MMOL/L (ref 3.5–5.1)
PROT SERPL-MCNC: 7 G/DL (ref 6.4–8.2)
PROT UR STRIP-MCNC: NEGATIVE MG/DL
RBC # BLD AUTO: 4.22 M/UL (ref 3.8–5.2)
RBC #/AREA URNS HPF: ABNORMAL /HPF (ref 0–5)
SODIUM SERPL-SCNC: 141 MMOL/L (ref 136–145)
SP GR UR REFRACTOMETRY: 1.01 (ref 1–1.03)
TROPONIN I SERPL-MCNC: <0.05 NG/ML
UR CULT HOLD, URHOLD: NORMAL
UROBILINOGEN UR QL STRIP.AUTO: 0.2 EU/DL (ref 0.2–1)
WBC # BLD AUTO: 7.6 K/UL (ref 3.6–11)
WBC URNS QL MICRO: ABNORMAL /HPF (ref 0–4)

## 2021-08-06 PROCEDURE — 81001 URINALYSIS AUTO W/SCOPE: CPT

## 2021-08-06 PROCEDURE — 72125 CT NECK SPINE W/O DYE: CPT

## 2021-08-06 PROCEDURE — 84484 ASSAY OF TROPONIN QUANT: CPT

## 2021-08-06 PROCEDURE — 70450 CT HEAD/BRAIN W/O DYE: CPT

## 2021-08-06 PROCEDURE — 36415 COLL VENOUS BLD VENIPUNCTURE: CPT

## 2021-08-06 PROCEDURE — 80053 COMPREHEN METABOLIC PANEL: CPT

## 2021-08-06 PROCEDURE — 99283 EMERGENCY DEPT VISIT LOW MDM: CPT

## 2021-08-06 PROCEDURE — 72131 CT LUMBAR SPINE W/O DYE: CPT

## 2021-08-06 PROCEDURE — 93005 ELECTROCARDIOGRAM TRACING: CPT

## 2021-08-06 PROCEDURE — 85025 COMPLETE CBC W/AUTO DIFF WBC: CPT

## 2021-08-06 NOTE — ED NOTES
Discharge instructions reviewed with family and patient. Opportunity to ask questions given.  Pt assisted to bathroom with 2 assist.

## 2021-08-06 NOTE — DISCHARGE INSTRUCTIONS
There was no evidence of infection in your urine and no electrolyte abnormalities. Your blood work showed you did not have heart injury. The CT head was normal today and CT neck showed degenerative disc disease. Please see PCP next week so they can titrate up her anxiety meds if needed. Thank you.

## 2021-08-06 NOTE — ED TRIAGE NOTES
Pt's daughter reports pt has had generalized weakness and increased anxiety. Recently had a UTI about a month ago with similar sx. Lives in assisted living and the nurse there recommended she come to the ED to be evaluated. Had outpatient CT scans as part of neurology workup for vascular dementia.

## 2021-08-07 NOTE — ED PROVIDER NOTES
Patient is an 78-year-old female with history of atrial fibrillation on anticoagulation, cataracts, diabetes, hyperlipidemia, hypertension, pacemaker who presents with worsening anxiety and fatigue. Daughter is at bedside and reports that patient saw neurology last week and had outpatient CT scans that were ordered at [address] [place] [address] today. When she went to  her mom from the nursing home, she appeared more fatigued than usual and was anxious. The charge nurse at the facility recommended that they come to the ER to rule out a UTI. No report of any fevers, chills, nausea, vomiting. No concern for acute change in mental status. Past Medical History:   Diagnosis Date    Atrial fibrillation ([address]     Cataract     Diabetes [facility]     Hypercholesterolemia     Hypertension     Pacemaker     Metronic generator changed on 5/3/18       Past Surgical History:   Procedure Laterality Date    COLONOSCOPY  4/14/2016         HX AFIB ABLATION      HX COLONOSCOPY      HX PACEMAKER  2007    Medtronic 2007/gen change 5/3/18         No family history on file. Social History     Socioeconomic History    Marital status: SINGLE     Spouse name: Not on file    Number of children: Not on file    Years of education: Not on file    Highest education level: Not on file   Occupational History    Not on file   Tobacco Use    Smoking status: Never Smoker    Smokeless tobacco: Never Used   Substance and Sexual Activity    Alcohol use: No    Drug use: No    Sexual activity: Not on file   Other Topics Concern    Not on file   Social History Narrative    Not on file     Social Determinants of Health     Financial Resource Strain:     Difficulty of Paying Living Expenses:    Food Insecurity:     Worried About Running Out of Food in the Last Year:     [address] in the Last Year:    Transportation Needs:     Lack of Transportation (Medical):      Lack of Transportation (Non-Medical):    Physical Activity:     Days of Exercise per Week:     Minutes of Exercise per Session:    Stress:     Feeling of Stress :    Social Connections:     Frequency of Communication with Friends and Family:     Frequency of Social Gatherings with Friends and Family:     Attends Buddhist Services:     Active Member of Clubs or Organizations:     Attends Club or Organization Meetings:     Marital Status:    Intimate Partner Violence:     Fear of Current or Ex-Partner:     Emotionally Abused:     Physically Abused:     Sexually Abused: ALLERGIES: Patient has no known allergies. Review of Systems   Constitutional: Positive for fatigue. Negative for chills and fever. HENT: Negative for drooling and nosebleeds. Eyes: Negative for pain and itching. Respiratory: Negative for choking and stridor. Cardiovascular: Negative for leg swelling. Gastrointestinal: Negative for abdominal distention and rectal pain. Endocrine: Negative for heat intolerance and polyphagia. Genitourinary: Negative for enuresis and genital sores. Musculoskeletal: Negative for arthralgias and joint swelling. Skin: Negative for color change. Allergic/Immunologic: Negative for immunocompromised state. Neurological: Negative for tremors and speech difficulty. Hematological: Negative for adenopathy. Psychiatric/Behavioral: Negative for dysphoric mood and sleep disturbance. The patient is nervous/anxious. Vitals:    08/06/21 1342   BP: 120/68   Pulse: 70   Resp: 18   Temp: 96.8 °F (36 °C)   SpO2: 99%   Weight: 64.2 kg (141 lb 8.6 oz)   Height: 5' 3\" (1.6 m)            Physical Exam  Vitals and nursing note reviewed. Constitutional:       General: She is not in acute distress. Appearance: She is well-developed. She is not toxic-appearing or diaphoretic. HENT:      Head: Normocephalic.       Nose: Nose normal.      Mouth/Throat:      Mouth: Mucous membranes are moist.   Eyes:      Conjunctiva/sclera: Conjunctivae normal.   Cardiovascular:      Rate and Rhythm: Normal rate and regular rhythm. Heart sounds: Normal heart sounds. Pulmonary:      Effort: Pulmonary effort is normal. No respiratory distress. Breath sounds: Normal breath sounds. Abdominal:      General: There is no distension. Palpations: Abdomen is soft. Musculoskeletal:         General: No deformity. Normal range of motion. Cervical back: Normal range of motion and neck supple. Skin:     General: Skin is warm and dry. Neurological:      Mental Status: She is alert. Mental status is at baseline. Coordination: Coordination normal.   Psychiatric:         Behavior: Behavior normal.          MDM  Number of Diagnoses or Management Options  Fatigue, unspecified type  Diagnosis management comments: No evidence of UTI today, no lab abnormalities noted. Daughter is comfortable taking patient home back to the nursing facility. Discussed findings on CT scan of head and C-spine. All questions were answered prior to discharge. Procedures    Patient's results have been reviewed with them. Patient and/or family have verbally conveyed their understanding and agreement of the patient's signs, symptoms, diagnosis, treatment and prognosis and additionally agree to follow up as recommended or return to the Emergency Room should their condition change prior to follow-up. Discharge instructions have also been provided to the patient with some educational information regarding their diagnosis as well a list of reasons why they would want to return to the ER prior to their follow-up appointment should their condition change.

## 2021-08-09 LAB
ATRIAL RATE: 70 BPM
CALCULATED P AXIS, ECG09: 73 DEGREES
CALCULATED R AXIS, ECG10: -82 DEGREES
CALCULATED T AXIS, ECG11: 91 DEGREES
DIAGNOSIS, 93000: NORMAL
Q-T INTERVAL, ECG07: 488 MS
QRS DURATION, ECG06: 140 MS
QTC CALCULATION (BEZET), ECG08: 530 MS
VENTRICULAR RATE, ECG03: 71 BPM

## 2022-02-04 ENCOUNTER — TELEPHONE (OUTPATIENT)
Dept: CARDIOLOGY CLINIC | Age: 87
End: 2022-02-04

## 2022-03-18 PROBLEM — Z95.0 PACEMAKER: Status: ACTIVE | Noted: 2018-04-25

## 2022-03-18 PROBLEM — D72.829 LEUKOCYTOSIS: Status: ACTIVE | Noted: 2019-06-25

## 2022-03-19 PROBLEM — D64.9 ANEMIA: Status: ACTIVE | Noted: 2019-06-25

## 2022-03-19 PROBLEM — I95.9 HYPOTENSION: Status: ACTIVE | Noted: 2019-06-25

## 2022-03-19 PROBLEM — N17.9 ARF (ACUTE RENAL FAILURE) (HCC): Status: ACTIVE | Noted: 2019-06-25

## 2022-03-19 PROBLEM — R53.1 WEAKNESS: Status: ACTIVE | Noted: 2019-06-25

## 2022-03-19 PROBLEM — E87.1 HYPONATREMIA: Status: ACTIVE | Noted: 2019-06-25

## 2022-03-19 PROBLEM — R05.9 COUGH: Status: ACTIVE | Noted: 2019-06-25

## 2022-03-19 PROBLEM — E86.0 DEHYDRATION: Status: ACTIVE | Noted: 2019-06-25

## 2022-04-10 ENCOUNTER — HOSPITAL ENCOUNTER (EMERGENCY)
Age: 87
Discharge: HOME | End: 2022-04-11
Attending: EMERGENCY MEDICINE
Payer: MEDICARE
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
## 2022-04-10 DIAGNOSIS — S09.90XA CLOSED HEAD INJURY, INITIAL ENCOUNTER: ICD-10-CM

## 2022-04-10 PROCEDURE — 99284 EMERGENCY DEPT VISIT MOD MDM: CPT

## 2022-04-11 ENCOUNTER — APPOINTMENT (OUTPATIENT)
Dept: CT IMAGING | Age: 87
End: 2022-04-11
Attending: EMERGENCY MEDICINE
Payer: MEDICARE

## 2022-04-11 VITALS
SYSTOLIC BLOOD PRESSURE: 144 MMHG | HEIGHT: 63 IN | BODY MASS INDEX: 24.8 KG/M2 | WEIGHT: 140 LBS | DIASTOLIC BLOOD PRESSURE: 86 MMHG | RESPIRATION RATE: 16 BRPM | OXYGEN SATURATION: 99 % | TEMPERATURE: 97.2 F | HEART RATE: 70 BPM

## 2022-04-11 PROCEDURE — 72125 CT NECK SPINE W/O DYE: CPT

## 2022-04-11 PROCEDURE — 70450 CT HEAD/BRAIN W/O DYE: CPT

## 2022-04-11 NOTE — ED PROVIDER NOTES
The history is provided by the patient. Fall  The accident occurred less than 1 hour ago. The fall occurred from a bed. She fell from a height of ground level. She landed on carpet. There was no blood loss. The patient is experiencing no pain. She was not ambulatory at the scene. There was no entrapment after the fall. There was no drug use involved in the accident. There was no alcohol use involved in the accident. Pertinent negatives include no visual change, no fever, no numbness, no abdominal pain, no bowel incontinence, no nausea, no vomiting, no hematuria, no headaches, no extremity weakness, no hearing loss, no loss of consciousness, no tingling and no laceration. The risk factors include being elderly. She has tried nothing for the symptoms. The treatment provided no relief. It is unknown when the patient last had a tetanus shot. Past Medical History:   Diagnosis Date    Atrial fibrillation ([address]     Cataract     Diabetes ([facility] [address]     Hypercholesterolemia     Hypertension     Pacemaker     Metronic generator changed on 5/3/18       Past Surgical History:   Procedure Laterality Date    COLONOSCOPY  4/14/2016         HX AFIB ABLATION      HX COLONOSCOPY      HX PACEMAKER  2007    Medtronic 2007/gen change 5/3/18         No family history on file.     Social History     Socioeconomic History    Marital status: SINGLE     Spouse name: Not on file    Number of children: Not on file    Years of education: Not on file    Highest education level: Not on file   Occupational History    Not on file   Tobacco Use    Smoking status: Never Smoker    Smokeless tobacco: Never Used   Substance and Sexual Activity    Alcohol use: No    Drug use: No    Sexual activity: Not on file   Other Topics Concern    Not on file   Social History Narrative    Not on file     Social Determinants of Health     Financial Resource Strain:     Difficulty of Paying Living Expenses: Not on file   Food Insecurity:  Worried About Running Out of Food in the Last Year: Not on file    Cait of Food in the Last Year: Not on file   Transportation Needs:     Lack of Transportation (Medical): Not on file    Lack of Transportation (Non-Medical): Not on file   Physical Activity:     Days of Exercise per Week: Not on file    Minutes of Exercise per Session: Not on file   Stress:     Feeling of Stress : Not on file   Social Connections:     Frequency of Communication with Friends and Family: Not on file    Frequency of Social Gatherings with Friends and Family: Not on file    Attends Zoroastrian Services: Not on file    Active Member of [address] [place] [address] [employer] or Organizations: Not on file    Attends Club or Organization Meetings: Not on file    Marital Status: Not on file   Intimate Partner Violence:     Fear of Current or Ex-Partner: Not on file    Emotionally Abused: Not on file    Physically Abused: Not on file    Sexually Abused: Not on file   Housing Stability:     Unable to Pay for Housing in the Last Year: Not on file    Number of [place] in the Last Year: Not on file    Unstable Housing in the Last Year: Not on file         ALLERGIES: Patient has no known allergies. Review of Systems   Constitutional: Negative for activity change, chills and fever. HENT: Negative for nosebleeds, sore throat, trouble swallowing and voice change. Eyes: Negative for visual disturbance. Respiratory: Negative for shortness of breath. Cardiovascular: Negative for chest pain and palpitations. Gastrointestinal: Negative for abdominal pain, bowel incontinence, constipation, diarrhea, nausea and vomiting. Genitourinary: Negative for difficulty urinating, dysuria, hematuria and urgency. Musculoskeletal: Negative for back pain, extremity weakness, neck pain and neck stiffness. Skin: Negative for color change. Allergic/Immunologic: Negative for immunocompromised state.    Neurological: Negative for dizziness, tingling, seizures, loss of consciousness, syncope, weakness, light-headedness, numbness and headaches. Psychiatric/Behavioral: Negative for behavioral problems, confusion, hallucinations, self-injury and suicidal ideas. Vitals:    04/10/22 2333 04/10/22 2341 04/10/22 2345   BP: (!) 159/64  (!) 145/63   Pulse: 71 83 70   Resp: 16  21   SpO2: 100%  98%   Weight: 63.5 kg (140 lb)     Height: 5' 3\" (1.6 m)              Physical Exam  Vitals and nursing note reviewed. Constitutional:       General: She is not in acute distress. Appearance: She is well-developed. She is not diaphoretic. HENT:      Head: Atraumatic. Jaw: There is normal jaw occlusion. Eyes:      Extraocular Movements: Extraocular movements intact. Neck:      Trachea: No tracheal deviation. Cardiovascular:      Comments: Warm and well perfused  Pulmonary:      Effort: Pulmonary effort is normal. No respiratory distress. Musculoskeletal:         General: Normal range of motion. Cervical back: Full passive range of motion without pain. No spinous process tenderness or muscular tenderness. Skin:     General: Skin is warm and dry. Findings: No laceration. Neurological:      Mental Status: She is alert. Coordination: Coordination normal.   Psychiatric:         Behavior: Behavior normal.         Thought Content: Thought content normal.         Judgment: Judgment normal.          MDM     This is an 69-year-old female with past medical history, review of systems, physical exam as above, presenting with complaints of falling from bed. Patient states she rolled out of bed this evening, had difficulty regaining her feet, and EMS was called for evaluation. She was recommended to be seen in the emergency department. Upon arrival patient noted to be awake and alert, in no acute distress, denies complaints. She is noted to be mildly hypertensive, afebrile without tachycardia, satting well on room air.   She has full range of motion upper and lower extremities, without pain, or crepitus, no cervical spine or paraspinal tenderness to palpation, equal reactive pupils, extraocular movements intact, no visible head trauma. Patient reportedly uses anticoagulants. Will obtain head and cervical spine imaging to evaluate for occult injury, reassess, and make a disposition.     Procedures

## 2022-04-11 NOTE — ED NOTES
Report called to Ibeth Javier, patient's ED summary reviewed. [facility] at bedside for transport back to [facility].  Pt tolerated transfer to EMS stretcher well

## 2022-04-11 NOTE — ED TRIAGE NOTES
[facility] patient sent here after falling out of bed tonight. Pt states she did hit her head. No LOC. Pt is on Eliquis. Pt without complaint.

## 2022-08-08 ENCOUNTER — TELEPHONE (OUTPATIENT)
Dept: CARDIOLOGY CLINIC | Age: 87
End: 2022-08-08

## 2022-08-08 NOTE — TELEPHONE ENCOUNTER
Pt daughter stated pt will not be able to make any appt as she is on hospice, please advise        Nicolette (daughter)  [phone]

## 2023-08-16 NOTE — TELEPHONE ENCOUNTER
Scheduled for Friday, May 11th 9am with Yuly Kumar. Render Post-Care Instructions In Note?: no Show Topical Anesthesia Variable?: Yes Post-Care Instructions: I reviewed with the patient in detail post-care instructions. Patient is to wear sunprotection, and avoid picking at any of the treated lesions. Pt may apply Vaseline to crusted or scabbing areas. Medical Necessity Clause: This procedure was medically necessary because the lesions that were treated were: Spray Paint Text: The liquid nitrogen was applied to the skin utilizing a spray paint frosting technique. Medical Necessity Information: It is in your best interest to select a reason for this procedure from the list below. All of these items fulfill various CMS LCD requirements except the new and changing color options. Consent: The patient's consent was obtained including but not limited to risks of crusting, scabbing, blistering, scarring, darker or lighter pigmentary change, recurrence, incomplete removal and infection. Detail Level: Detailed